# Patient Record
Sex: FEMALE | Race: WHITE | NOT HISPANIC OR LATINO | URBAN - METROPOLITAN AREA
[De-identification: names, ages, dates, MRNs, and addresses within clinical notes are randomized per-mention and may not be internally consistent; named-entity substitution may affect disease eponyms.]

---

## 2023-02-13 ENCOUNTER — INPATIENT (INPATIENT)
Facility: HOSPITAL | Age: 73
LOS: 3 days | Discharge: HOME | DRG: 602 | End: 2023-02-17
Attending: STUDENT IN AN ORGANIZED HEALTH CARE EDUCATION/TRAINING PROGRAM | Admitting: STUDENT IN AN ORGANIZED HEALTH CARE EDUCATION/TRAINING PROGRAM
Payer: MEDICARE

## 2023-02-13 VITALS
TEMPERATURE: 98 F | HEART RATE: 75 BPM | OXYGEN SATURATION: 99 % | HEIGHT: 66 IN | SYSTOLIC BLOOD PRESSURE: 94 MMHG | WEIGHT: 139.99 LBS | RESPIRATION RATE: 18 BRPM | DIASTOLIC BLOOD PRESSURE: 54 MMHG

## 2023-02-13 LAB
ALBUMIN SERPL ELPH-MCNC: 3 G/DL — LOW (ref 3.3–5)
ALP SERPL-CCNC: 86 U/L — SIGNIFICANT CHANGE UP (ref 40–120)
ALT FLD-CCNC: 31 U/L — SIGNIFICANT CHANGE UP (ref 10–45)
ANION GAP SERPL CALC-SCNC: 13 MMOL/L — SIGNIFICANT CHANGE UP (ref 5–17)
APTT BLD: 22.8 SEC — LOW (ref 27.5–35.5)
AST SERPL-CCNC: 45 U/L — HIGH (ref 10–40)
BASOPHILS # BLD AUTO: 0 K/UL — SIGNIFICANT CHANGE UP (ref 0–0.2)
BASOPHILS NFR BLD AUTO: 0 % — SIGNIFICANT CHANGE UP (ref 0–2)
BILIRUB SERPL-MCNC: 0.7 MG/DL — SIGNIFICANT CHANGE UP (ref 0.2–1.2)
BUN SERPL-MCNC: 30 MG/DL — HIGH (ref 7–23)
BURR CELLS BLD QL SMEAR: PRESENT — SIGNIFICANT CHANGE UP
CALCIUM SERPL-MCNC: 8.8 MG/DL — SIGNIFICANT CHANGE UP (ref 8.4–10.5)
CHLORIDE SERPL-SCNC: 98 MMOL/L — SIGNIFICANT CHANGE UP (ref 96–108)
CO2 SERPL-SCNC: 21 MMOL/L — LOW (ref 22–31)
CREAT SERPL-MCNC: 1.06 MG/DL — SIGNIFICANT CHANGE UP (ref 0.5–1.3)
EGFR: 55 ML/MIN/1.73M2 — LOW
EOSINOPHIL # BLD AUTO: 0.19 K/UL — SIGNIFICANT CHANGE UP (ref 0–0.5)
EOSINOPHIL NFR BLD AUTO: 3.5 % — SIGNIFICANT CHANGE UP (ref 0–6)
GIANT PLATELETS BLD QL SMEAR: PRESENT — SIGNIFICANT CHANGE UP
GLUCOSE SERPL-MCNC: 189 MG/DL — HIGH (ref 70–99)
HCT VFR BLD CALC: 32.6 % — LOW (ref 34.5–45)
HGB BLD-MCNC: 10.6 G/DL — LOW (ref 11.5–15.5)
INR BLD: 1.05 — SIGNIFICANT CHANGE UP (ref 0.88–1.16)
LYMPHOCYTES # BLD AUTO: 0.47 K/UL — LOW (ref 1–3.3)
LYMPHOCYTES # BLD AUTO: 8.7 % — LOW (ref 13–44)
MANUAL SMEAR VERIFICATION: SIGNIFICANT CHANGE UP
MCHC RBC-ENTMCNC: 27 PG — SIGNIFICANT CHANGE UP (ref 27–34)
MCHC RBC-ENTMCNC: 32.5 GM/DL — SIGNIFICANT CHANGE UP (ref 32–36)
MCV RBC AUTO: 83 FL — SIGNIFICANT CHANGE UP (ref 80–100)
METAMYELOCYTES # FLD: 0.9 % — HIGH (ref 0–0)
MONOCYTES # BLD AUTO: 0.19 K/UL — SIGNIFICANT CHANGE UP (ref 0–0.9)
MONOCYTES NFR BLD AUTO: 3.5 % — SIGNIFICANT CHANGE UP (ref 2–14)
MYELOCYTES NFR BLD: 2.6 % — HIGH (ref 0–0)
NEUTROPHILS # BLD AUTO: 4.29 K/UL — SIGNIFICANT CHANGE UP (ref 1.8–7.4)
NEUTROPHILS NFR BLD AUTO: 72.2 % — SIGNIFICANT CHANGE UP (ref 43–77)
NEUTS BAND # BLD: 7.8 % — SIGNIFICANT CHANGE UP (ref 0–8)
OVALOCYTES BLD QL SMEAR: SLIGHT — SIGNIFICANT CHANGE UP
PLAT MORPH BLD: NORMAL — SIGNIFICANT CHANGE UP
PLATELET # BLD AUTO: 100 K/UL — LOW (ref 150–400)
POIKILOCYTOSIS BLD QL AUTO: SIGNIFICANT CHANGE UP
POTASSIUM SERPL-MCNC: 4.8 MMOL/L — SIGNIFICANT CHANGE UP (ref 3.5–5.3)
POTASSIUM SERPL-SCNC: 4.8 MMOL/L — SIGNIFICANT CHANGE UP (ref 3.5–5.3)
PROT SERPL-MCNC: 6.4 G/DL — SIGNIFICANT CHANGE UP (ref 6–8.3)
PROTHROM AB SERPL-ACNC: 12.5 SEC — SIGNIFICANT CHANGE UP (ref 10.5–13.4)
RBC # BLD: 3.93 M/UL — SIGNIFICANT CHANGE UP (ref 3.8–5.2)
RBC # FLD: 12.6 % — SIGNIFICANT CHANGE UP (ref 10.3–14.5)
RBC BLD AUTO: ABNORMAL
SARS-COV-2 RNA SPEC QL NAA+PROBE: NEGATIVE — SIGNIFICANT CHANGE UP
SODIUM SERPL-SCNC: 132 MMOL/L — LOW (ref 135–145)
SPHEROCYTES BLD QL SMEAR: SLIGHT — SIGNIFICANT CHANGE UP
VARIANT LYMPHS # BLD: 0.8 % — SIGNIFICANT CHANGE UP (ref 0–6)
WBC # BLD: 5.36 K/UL — SIGNIFICANT CHANGE UP (ref 3.8–10.5)
WBC # FLD AUTO: 5.36 K/UL — SIGNIFICANT CHANGE UP (ref 3.8–10.5)

## 2023-02-13 PROCEDURE — 99223 1ST HOSP IP/OBS HIGH 75: CPT | Mod: GC

## 2023-02-13 PROCEDURE — 99285 EMERGENCY DEPT VISIT HI MDM: CPT

## 2023-02-13 RX ORDER — CEFTRIAXONE 500 MG/1
1000 INJECTION, POWDER, FOR SOLUTION INTRAMUSCULAR; INTRAVENOUS ONCE
Refills: 0 | Status: COMPLETED | OUTPATIENT
Start: 2023-02-13 | End: 2023-02-14

## 2023-02-13 RX ORDER — VANCOMYCIN HCL 1 G
1000 VIAL (EA) INTRAVENOUS ONCE
Refills: 0 | Status: COMPLETED | OUTPATIENT
Start: 2023-02-13 | End: 2023-02-13

## 2023-02-13 RX ADMIN — Medication 250 MILLIGRAM(S): at 21:43

## 2023-02-13 NOTE — ED PROVIDER NOTE - OBJECTIVE STATEMENT
Symptoms started approximately 1 month ago with ulcer  Patient went to UC and was put on Keflex  Went back to UC 2 wks later and was switched to Clinda  Was able to see ID MD Monroy last week and was started on Bactrim on Wednesday  Since yesterday, patient feeling generally unwell.  Malaise, feeling like she is getting sick  Redness persists around wound  Had televist with Dr. Monroy today who noted that wound/redness not improved and advised them to go to the ER for admission for IV abx.

## 2023-02-13 NOTE — H&P ADULT - PROBLEM SELECTOR PLAN 5
Na of 132 on admission. may be in setting of decreased PO intake as patient reported GI upset over the weekend  -f/u AM BMP, if persistently hyponatremic will pursue further work up

## 2023-02-13 NOTE — H&P ADULT - NSHPLABSRESULTS_GEN_ALL_CORE
.  LABS:                         10.6   5.36  )-----------( 100      ( 13 Feb 2023 19:51 )             32.6     02-13    132<L>  |  98  |  30<H>  ----------------------------<  189<H>  4.8   |  21<L>  |  1.06    Ca    8.8      13 Feb 2023 19:51    TPro  6.4  /  Alb  3.0<L>  /  TBili  0.7  /  DBili  x   /  AST  45<H>  /  ALT  31  /  AlkPhos  86  02-13    PT/INR - ( 13 Feb 2023 19:51 )   PT: 12.5 sec;   INR: 1.05          PTT - ( 13 Feb 2023 19:51 )  PTT:22.8 sec          RADIOLOGY, EKG & ADDITIONAL TESTS: Reviewed.

## 2023-02-13 NOTE — H&P ADULT - PROBLEM SELECTOR PLAN 1
Pt presenting for a 4 week history of persistent LLE ulcer and cellulitis. States she does not know how she got the wound, may have had trauma to the area but is unsure. Followed up with urgent care; took a 10 day course of Keflex which did not improve the symptoms, followed by one week of Clindamycin. Given persistent symptoms, patient followed with ID, Dr. Monroy outpatient, who prescribed Bactrim. After one week of Bactrim, pt had a follow up visit today and given persistent erythema, Dr. Monroy recommended admission for IV antibiotics. Pt denies fevers/chills at home, not septic on admission  -continue Vanc 1g q12h - trough before the 4th dose Pt presenting for a 4 week history of persistent LLE ulcer and cellulitis. States she does not know how she got the wound, may have had trauma to the area but is unsure. Followed up with urgent care; took a 10 day course of Keflex which did not improve the symptoms, followed by one week of Clindamycin. Given persistent symptoms, patient followed with ID, Dr. Monroy outpatient, who prescribed Bactrim. After one week of Bactrim, pt had a follow up visit today and given persistent erythema, Dr. Monroy recommended admission for IV antibiotics. Pt denies fevers/chills at home, not septic on admission  -continue Vanc 1g q12h - trough before the 4th dose  -f/u BCx  -CT LLE with IV contrast to r/o abscess  -general surgery consulted in the ED, f/u recommendations Pt presenting for a 4 week history of persistent LLE ulcer and cellulitis. States she does not know how she got the wound, may have had trauma to the area but is unsure. Followed up with urgent care; took a 10 day course of Keflex which did not improve the symptoms, followed by one week of Clindamycin. Given persistent symptoms, patient followed with ID, Dr. Monroy outpatient, who prescribed Bactrim. After one week of Bactrim, pt had a follow up visit today and given persistent erythema, Dr. Monroy recommended admission for IV antibiotics. Pt denies fevers/chills at home, not septic on admission. On physical exam, 3x3 cm area of eschar with surrounding erythema. No tenderness, fluctuance or purulence  -s/p Vanc and CTX in the ED  -f/u BCx  -CT LLE with IV contrast to r/o abscess  -general surgery consulted in the ED, f/u recommendations Pt presenting for a 4 week history of persistent LLE ulcer and cellulitis. States she does not know how she got the wound, may have had trauma to the area but is unsure. Followed up with urgent care; took a 10 day course of Keflex which did not improve the symptoms, followed by one week of Clindamycin. Given persistent symptoms, patient followed with ID, Dr. Monroy outpatient, who prescribed Bactrim. After one week of Bactrim, pt had a follow up visit today and given persistent erythema, Dr. Monroy recommended admission for IV antibiotics. Pt denies fevers/chills at home, not septic on admission. On physical exam, 3x3 cm area of eschar with surrounding erythema. No tenderness, fluctuance or purulence  -s/p Vanc and CTX in the ED; continue Vancomycin 1g q12h (trough before 4th dose) and CTX 1g q24h  -f/u ESR, CRP  -f/u BCx  -Xray LLE pending  -general surgery consulted in the ED, f/u recommendations Pt presenting for a 4 week history of persistent LLE ulcer and cellulitis. States she does not know how she got the wound, may have had trauma to the area but is unsure. Followed up with urgent care; took a 10 day course of Keflex which did not improve the symptoms, followed by one week of Clindamycin. Given persistent symptoms, patient followed with ID, Dr. Monroy outpatient, who prescribed Bactrim. After one week of Bactrim, pt had a follow up visit today and given persistent erythema, Dr. Monroy recommended admission for IV antibiotics. Pt denies fevers/chills at home, not septic on admission. On physical exam, 3x3 cm area of eschar with surrounding erythema. No tenderness, fluctuance or purulence  -s/p Vanc and CTX in the ED; continue Vancomycin 1g q12h (trough before 4th dose) and CTX 1g q24h  -f/u ESR, CRP  -f/u BCx  -Xray LLE pending  -general surgery consulted in the ED, f/u recommendations  -wound care Pt presenting for a 4 week history of persistent LLE ulcer and cellulitis. States she does not know how she got the wound, may have had trauma to the area but is unsure. Followed up with urgent care; took a 10 day course of Keflex which did not improve the symptoms, followed by one week of Clindamycin. Given persistent symptoms, patient followed with ID, Dr. Monroy outpatient, who prescribed Bactrim. After one week of Bactrim, pt had a follow up visit today and given persistent erythema, Dr. Monroy recommended admission for IV antibiotics. Pt denies fevers/chills at home, not septic on admission. On physical exam, 3x3 cm area of eschar with surrounding erythema. No tenderness, fluctuance or purulence  -s/p Vanc and CTX in the ED; continue Vancomycin 1g q24h (trough before 4th dose) and CTX 1g q24h  -f/u ESR, CRP  -f/u BCx  -Xray LLE pending  -general surgery consulted in the ED, f/u recommendations  -wound care

## 2023-02-13 NOTE — H&P ADULT - ASSESSMENT
72F with pmhx of HTN, HLD, DM, who presents for management of 4 week history of LLE ulcer and cellulitis, admitted for IV antibiotics and further management.

## 2023-02-13 NOTE — ED PROVIDER NOTE - PHYSICAL EXAMINATION
General:  Well appearing, no distress  HEENT:  No conjunctival injection, neck supple   Chest:  Non-tender, no crepitance  Lungs:  Clear to auscultation bilaterally   Heart:  s1s2 normal, no murmur  Abdomen:  soft, non-tender, non-distended  :  Deferred  Rectal:  Deferred  Extremities: Left calf lateral aspect with 3x2 cm wound with eschar with surrounding erythema c/w cellulitis.  No lymphangitis.  Mildly tender. No crepitance, no subcut air.  Neuro:  Alert and oriented x 3, cn2-12 intact, full strength, sensory grossly intact, normal gait  Psychiatry:  Calm, cooperative, no expression of suicidal or homicidal ideation

## 2023-02-13 NOTE — H&P ADULT - PROBLEM SELECTOR PLAN 3
Does not remember which medications she takes at home  -normotensive at home  -med rec in AM  -restart BP medications as needed

## 2023-02-13 NOTE — ED ADULT NURSE NOTE - NSIMPLEMENTINTERV_GEN_ALL_ED
Implemented All Universal Safety Interventions:  [place] to call system. Call bell, personal items and telephone within reach. Instruct patient to call for assistance. Room bathroom lighting operational. Non-slip footwear when patient is off stretcher. Physically safe environment: no spills, clutter or unnecessary equipment. Stretcher in lowest position, wheels locked, appropriate side rails in place.

## 2023-02-13 NOTE — H&P ADULT - NSHPREVIEWOFSYSTEMS_GEN_ALL_CORE
REVIEW OF SYSTEMS:    CONSTITUTIONAL: No weakness, fevers or chills  EYES/ENT: No visual changes;  No vertigo or throat pain   NECK: No pain or stiffness  RESPIRATORY: No cough, wheezing, hemoptysis; No shortness of breath  CARDIOVASCULAR: No chest pain or palpitations  GASTROINTESTINAL: No abdominal or epigastric pain. No nausea, vomiting, or hematemesis; No diarrhea or constipation. No melena or hematochezia.  GENITOURINARY: No dysuria, frequency or hematuria  NEUROLOGICAL: No numbness or weakness  SKIN: No itching, rashes REVIEW OF SYSTEMS:    CONSTITUTIONAL: No fevers or chills  EYES/ENT: No visual changes;  No vertigo or throat pain   NECK: No pain or stiffness  RESPIRATORY: No cough, wheezing, hemoptysis; No shortness of breath  CARDIOVASCULAR: No chest pain or palpitations  GASTROINTESTINAL: No abdominal or epigastric pain. No nausea, vomiting, or hematemesis; No diarrhea or constipation. No melena or hematochezia.  GENITOURINARY: No dysuria, frequency or hematuria  NEUROLOGICAL: No numbness or weakness  SKIN: 4 week hx of wound to the lateral aspect of left leg

## 2023-02-13 NOTE — H&P ADULT - PROBLEM SELECTOR PLAN 2
Reports she uses Metformin 500mg qD at home  -f/u A1c in AM  -mISS while inpatient  -consistent carb diet

## 2023-02-13 NOTE — H&P ADULT - NSHPPHYSICALEXAM_GEN_ALL_CORE
.  VITAL SIGNS:  T(C): 36.8 (02-13-23 @ 22:23), Max: 36.8 (02-13-23 @ 19:18)  T(F): 98.2 (02-13-23 @ 22:23), Max: 98.3 (02-13-23 @ 19:18)  HR: 75 (02-13-23 @ 22:23) (75 - 75)  BP: 106/66 (02-13-23 @ 22:23) (94/54 - 106/66)  BP(mean): --  RR: 18 (02-13-23 @ 22:23) (18 - 18)  SpO2: 95% (02-13-23 @ 22:23) (95% - 99%)  Wt(kg): --    PHYSICAL EXAM:    Constitutional: WDWN resting comfortably in bed; NAD  Head: NC/AT  Eyes: PERRL, EOMI, anicteric sclera  ENT: no nasal discharge; no oropharyngeal erythema or exudates; MMM  Neck: supple; no JVD  Respiratory: CTA B/L; no W/R/R  Cardiac: +S1/S2; RRR; no M/R/G  Gastrointestinal: abdomen soft, NT/ND; no rebound or guarding; +BSx4  Extremities: WWP, no edema  left lower extremity: lateral aspect with 3x3 cm wound with eschar, +surrounding erythema. no tenderness to palpation, no active drainage, no crepitus  Vascular: 1+ radial, DP/PT pulses B/L  Dermatologic: skin warm, dry and intact; +chronic venous stasis changes to b/l lower extremities  Neurologic: AAOx3; CNII-XII grossly intact; no focal deficits  Psychiatric: affect and characteristics of appearance, verbalizations, behaviors are appropriate

## 2023-02-13 NOTE — ED ADULT NURSE NOTE - OBJECTIVE STATEMENT
Patient presents to the ED complaining of wound to left lower leg for 4 weeks. As per daughter, the wound was being treated outpatient by her PMD however th wound is not healing. Patient reports feeling unwell. As per daughter, patient was sent to be admitted for IV antibiotics.

## 2023-02-13 NOTE — ED PROVIDER NOTE - CLINICAL SUMMARY MEDICAL DECISION MAKING FREE TEXT BOX
Patient with diabetes with infected ulcer to left calf not responding to multiple courses of outpatient antibiotics.  This may in part be due to need for wound debridement.  Will check labs and admit to medicine, consult surgery.    2125:  Surgery team advised of consult  21:35:  [facility] informed of admission and patient accepted.

## 2023-02-13 NOTE — H&P ADULT - HISTORY OF PRESENT ILLNESS
72F with pmhx of HTN, HLD, DM, who presents for management of 4 week history of LLE ulcer and cellulitis. Pt states 4 weeks ago, she noticed an ulcer to her LLE. States she may have hit her leg on something, but cannot remember. Reports initially there was pus draining from the area but after a few days, the ulcer dried up. States she initially went to an urgent care 4 weeks ago and was prescribed Keflex 10 days, which she completed a course of. After completing the course, she noticed the erythema and swelling was persistent and revisited the urgent care, who then prescribed her Clindamycin. Patient then took a week of Clindamycin, but due to persistent erythema, she follows with Infectious Disease, Dr. Monroy, who started her on Bactrim. The daughter noticed that over the weekend the patient was reporting generalized fatigue with nausea, so they scheduled a follow up with Dr. Monroy today. Pt had a telehealth visit with Dr. Monroy today, who noted the erythema was persistent and he recommended the patient come to the ED for IV antibiotics and a CT scan. Pt denies fevers, chills at home, denies pain to the lower extremities, had mild GI upset over the weekend but no longer has abdominal pain, no n/v/d. Denies hx of similar symptoms in the past.    In the ED  Vitals: T 98.3, HR 75, 94/54, 99% on RA  Labs: WBC 5.36, Hgb 10.6 (MCV 83), plt 100, Na 132, Cr 1.06, AST 45/ALT 31  Interventions: CTX1g, Vanc 1g     72F with pmhx of HTN, HLD, DM, who presents for management of 4 week history of LLE ulcer and cellulitis. Pt states 4 weeks ago, she noticed an ulcer to her LLE. States she may have hit her leg on something, but cannot remember. Reports initially there was pus draining from the area but after a few days, the ulcer dried up. States she initially went to an urgent care 4 weeks ago and was prescribed Keflex 10 days, which she completed a course of. After completing the course, she noticed the erythema and swelling was persistent and revisited the urgent care, who then prescribed her Clindamycin. Patient then took a week of Clindamycin, but due to persistent erythema, she follows with Infectious Disease, Dr. Monroy, who started her on Bactrim. The daughter noticed that over the weekend the patient was reporting generalized fatigue with nausea, so they scheduled a follow up with Dr. Monroy today. Pt had a telehealth visit with Dr. Monroy today, who noted the erythema was persistent and he recommended the patient come to the ED for IV antibiotics and a CT scan. Pt denies fevers, chills at home, denies pain to the lower extremities, had mild GI upset over the weekend but no longer has abdominal pain, no n/v/d. Denies hx of similar symptoms in the past.    In the ED  Vitals: T 98.3, HR 75, 94/54, 99% on RA  Labs: WBC 5.36, Hgb 10.6 (MCV 83), plt 100, Na 132, Cr 1.06, AST 45/ALT 31  Interventions: CTX1g, Vanc 1g  Consults: general surgery

## 2023-02-13 NOTE — ED ADULT TRIAGE NOTE - CHIEF COMPLAINT QUOTE
Pt sent by Dr. Monroy for admission for IV ABX for L lateral leg wound. Completed course of Keflex and Bactrim without improvement. Hx T2DM.

## 2023-02-14 DIAGNOSIS — E87.1 HYPO-OSMOLALITY AND HYPONATREMIA: ICD-10-CM

## 2023-02-14 DIAGNOSIS — E11.9 TYPE 2 DIABETES MELLITUS WITHOUT COMPLICATIONS: ICD-10-CM

## 2023-02-14 DIAGNOSIS — D64.9 ANEMIA, UNSPECIFIED: ICD-10-CM

## 2023-02-14 DIAGNOSIS — E78.5 HYPERLIPIDEMIA, UNSPECIFIED: ICD-10-CM

## 2023-02-14 DIAGNOSIS — Z29.9 ENCOUNTER FOR PROPHYLACTIC MEASURES, UNSPECIFIED: ICD-10-CM

## 2023-02-14 DIAGNOSIS — L03.119 CELLULITIS OF UNSPECIFIED PART OF LIMB: ICD-10-CM

## 2023-02-14 DIAGNOSIS — I10 ESSENTIAL (PRIMARY) HYPERTENSION: ICD-10-CM

## 2023-02-14 LAB
A1C WITH ESTIMATED AVERAGE GLUCOSE RESULT: 8.9 % — HIGH (ref 4–5.6)
ALBUMIN SERPL ELPH-MCNC: 3.2 G/DL — LOW (ref 3.3–5)
ALBUMIN SERPL ELPH-MCNC: 3.4 G/DL — SIGNIFICANT CHANGE UP (ref 3.3–5)
ALP SERPL-CCNC: 84 U/L — SIGNIFICANT CHANGE UP (ref 40–120)
ALP SERPL-CCNC: 93 U/L — SIGNIFICANT CHANGE UP (ref 40–120)
ALT FLD-CCNC: 102 U/L — HIGH (ref 10–45)
ALT FLD-CCNC: 95 U/L — HIGH (ref 10–45)
ANION GAP SERPL CALC-SCNC: 11 MMOL/L — SIGNIFICANT CHANGE UP (ref 5–17)
ANION GAP SERPL CALC-SCNC: 9 MMOL/L — SIGNIFICANT CHANGE UP (ref 5–17)
AST SERPL-CCNC: 124 U/L — HIGH (ref 10–40)
AST SERPL-CCNC: 137 U/L — HIGH (ref 10–40)
BASOPHILS # BLD AUTO: 0.03 K/UL — SIGNIFICANT CHANGE UP (ref 0–0.2)
BASOPHILS NFR BLD AUTO: 0.8 % — SIGNIFICANT CHANGE UP (ref 0–2)
BILIRUB SERPL-MCNC: 0.6 MG/DL — SIGNIFICANT CHANGE UP (ref 0.2–1.2)
BILIRUB SERPL-MCNC: 0.6 MG/DL — SIGNIFICANT CHANGE UP (ref 0.2–1.2)
BUN SERPL-MCNC: 18 MG/DL — SIGNIFICANT CHANGE UP (ref 7–23)
BUN SERPL-MCNC: 19 MG/DL — SIGNIFICANT CHANGE UP (ref 7–23)
CALCIUM SERPL-MCNC: 8.8 MG/DL — SIGNIFICANT CHANGE UP (ref 8.4–10.5)
CALCIUM SERPL-MCNC: 9 MG/DL — SIGNIFICANT CHANGE UP (ref 8.4–10.5)
CHLORIDE SERPL-SCNC: 100 MMOL/L — SIGNIFICANT CHANGE UP (ref 96–108)
CHLORIDE SERPL-SCNC: 101 MMOL/L — SIGNIFICANT CHANGE UP (ref 96–108)
CO2 SERPL-SCNC: 21 MMOL/L — LOW (ref 22–31)
CO2 SERPL-SCNC: 24 MMOL/L — SIGNIFICANT CHANGE UP (ref 22–31)
CREAT SERPL-MCNC: 0.57 MG/DL — SIGNIFICANT CHANGE UP (ref 0.5–1.3)
CREAT SERPL-MCNC: 0.59 MG/DL — SIGNIFICANT CHANGE UP (ref 0.5–1.3)
CRP SERPL-MCNC: 44.2 MG/L — HIGH (ref 0–4)
CRP SERPL-MCNC: 48.4 MG/L — HIGH (ref 0–4)
CRP SERPL-MCNC: 52 MG/L — HIGH (ref 0–4)
EGFR: 95 ML/MIN/1.73M2 — SIGNIFICANT CHANGE UP
EGFR: 96 ML/MIN/1.73M2 — SIGNIFICANT CHANGE UP
EOSINOPHIL # BLD AUTO: 0.29 K/UL — SIGNIFICANT CHANGE UP (ref 0–0.5)
EOSINOPHIL NFR BLD AUTO: 7.7 % — HIGH (ref 0–6)
ERYTHROCYTE [SEDIMENTATION RATE] IN BLOOD: 45 MM/HR — HIGH
ERYTHROCYTE [SEDIMENTATION RATE] IN BLOOD: 50 MM/HR — HIGH
ESTIMATED AVERAGE GLUCOSE: 209 MG/DL — HIGH (ref 68–114)
FERRITIN SERPL-MCNC: 2039 NG/ML — HIGH (ref 15–150)
GLUCOSE BLDC GLUCOMTR-MCNC: 163 MG/DL — HIGH (ref 70–99)
GLUCOSE BLDC GLUCOMTR-MCNC: 183 MG/DL — HIGH (ref 70–99)
GLUCOSE BLDC GLUCOMTR-MCNC: 210 MG/DL — HIGH (ref 70–99)
GLUCOSE BLDC GLUCOMTR-MCNC: 214 MG/DL — HIGH (ref 70–99)
GLUCOSE SERPL-MCNC: 162 MG/DL — HIGH (ref 70–99)
GLUCOSE SERPL-MCNC: 171 MG/DL — HIGH (ref 70–99)
HCT VFR BLD CALC: 35.4 % — SIGNIFICANT CHANGE UP (ref 34.5–45)
HCV AB S/CO SERPL IA: 0.08 S/CO — SIGNIFICANT CHANGE UP
HCV AB SERPL-IMP: SIGNIFICANT CHANGE UP
HGB BLD-MCNC: 11.5 G/DL — SIGNIFICANT CHANGE UP (ref 11.5–15.5)
IMM GRANULOCYTES NFR BLD AUTO: 2.4 % — HIGH (ref 0–0.9)
IRON SATN MFR SERPL: 54 % — HIGH (ref 14–50)
IRON SATN MFR SERPL: 82 UG/DL — SIGNIFICANT CHANGE UP (ref 30–160)
LYMPHOCYTES # BLD AUTO: 1.25 K/UL — SIGNIFICANT CHANGE UP (ref 1–3.3)
LYMPHOCYTES # BLD AUTO: 33.3 % — SIGNIFICANT CHANGE UP (ref 13–44)
MAGNESIUM SERPL-MCNC: 1.6 MG/DL — SIGNIFICANT CHANGE UP (ref 1.6–2.6)
MAGNESIUM SERPL-MCNC: 1.8 MG/DL — SIGNIFICANT CHANGE UP (ref 1.6–2.6)
MCHC RBC-ENTMCNC: 27 PG — SIGNIFICANT CHANGE UP (ref 27–34)
MCHC RBC-ENTMCNC: 32.5 GM/DL — SIGNIFICANT CHANGE UP (ref 32–36)
MCV RBC AUTO: 83.1 FL — SIGNIFICANT CHANGE UP (ref 80–100)
MONOCYTES # BLD AUTO: 0.38 K/UL — SIGNIFICANT CHANGE UP (ref 0–0.9)
MONOCYTES NFR BLD AUTO: 10.1 % — SIGNIFICANT CHANGE UP (ref 2–14)
NEUTROPHILS # BLD AUTO: 1.71 K/UL — LOW (ref 1.8–7.4)
NEUTROPHILS NFR BLD AUTO: 45.7 % — SIGNIFICANT CHANGE UP (ref 43–77)
NRBC # BLD: 0 /100 WBCS — SIGNIFICANT CHANGE UP (ref 0–0)
PHOSPHATE SERPL-MCNC: 2.3 MG/DL — LOW (ref 2.5–4.5)
PHOSPHATE SERPL-MCNC: 2.5 MG/DL — SIGNIFICANT CHANGE UP (ref 2.5–4.5)
PLATELET # BLD AUTO: 94 K/UL — LOW (ref 150–400)
POTASSIUM SERPL-MCNC: 4.4 MMOL/L — SIGNIFICANT CHANGE UP (ref 3.5–5.3)
POTASSIUM SERPL-MCNC: 4.7 MMOL/L — SIGNIFICANT CHANGE UP (ref 3.5–5.3)
POTASSIUM SERPL-SCNC: 4.4 MMOL/L — SIGNIFICANT CHANGE UP (ref 3.5–5.3)
POTASSIUM SERPL-SCNC: 4.7 MMOL/L — SIGNIFICANT CHANGE UP (ref 3.5–5.3)
PROT SERPL-MCNC: 6 G/DL — SIGNIFICANT CHANGE UP (ref 6–8.3)
PROT SERPL-MCNC: 6.6 G/DL — SIGNIFICANT CHANGE UP (ref 6–8.3)
RBC # BLD: 4.26 M/UL — SIGNIFICANT CHANGE UP (ref 3.8–5.2)
RBC # FLD: 12.9 % — SIGNIFICANT CHANGE UP (ref 10.3–14.5)
SODIUM SERPL-SCNC: 132 MMOL/L — LOW (ref 135–145)
SODIUM SERPL-SCNC: 134 MMOL/L — LOW (ref 135–145)
TIBC SERPL-MCNC: 153 UG/DL — LOW (ref 220–430)
TRANSFERRIN SERPL-MCNC: 141 MG/DL — LOW (ref 200–360)
UIBC SERPL-MCNC: 71 UG/DL — LOW (ref 110–370)
WBC # BLD: 3.75 K/UL — LOW (ref 3.8–10.5)
WBC # FLD AUTO: 3.75 K/UL — LOW (ref 3.8–10.5)

## 2023-02-14 PROCEDURE — 73701 CT LOWER EXTREMITY W/DYE: CPT | Mod: 26,LT

## 2023-02-14 PROCEDURE — 73590 X-RAY EXAM OF LOWER LEG: CPT | Mod: 26,LT

## 2023-02-14 PROCEDURE — 99233 SBSQ HOSP IP/OBS HIGH 50: CPT | Mod: GC

## 2023-02-14 RX ORDER — CEFEPIME 1 G/1
2000 INJECTION, POWDER, FOR SOLUTION INTRAMUSCULAR; INTRAVENOUS EVERY 8 HOURS
Refills: 0 | Status: DISCONTINUED | OUTPATIENT
Start: 2023-02-14 | End: 2023-02-15

## 2023-02-14 RX ORDER — ATORVASTATIN CALCIUM 80 MG/1
20 TABLET, FILM COATED ORAL AT BEDTIME
Refills: 0 | Status: DISCONTINUED | OUTPATIENT
Start: 2023-02-14 | End: 2023-02-17

## 2023-02-14 RX ORDER — LINAGLIPTIN 5 MG/1
1 TABLET, FILM COATED ORAL
Qty: 0 | Refills: 0 | DISCHARGE

## 2023-02-14 RX ORDER — DEXTROSE 50 % IN WATER 50 %
25 SYRINGE (ML) INTRAVENOUS ONCE
Refills: 0 | Status: DISCONTINUED | OUTPATIENT
Start: 2023-02-14 | End: 2023-02-17

## 2023-02-14 RX ORDER — SODIUM CHLORIDE 9 MG/ML
1000 INJECTION, SOLUTION INTRAVENOUS
Refills: 0 | Status: DISCONTINUED | OUTPATIENT
Start: 2023-02-14 | End: 2023-02-17

## 2023-02-14 RX ORDER — FESOTERODINE FUMARATE 8 MG/1
1 TABLET, FILM COATED, EXTENDED RELEASE ORAL
Qty: 0 | Refills: 0 | DISCHARGE

## 2023-02-14 RX ORDER — VANCOMYCIN HCL 1 G
1000 VIAL (EA) INTRAVENOUS EVERY 12 HOURS
Refills: 0 | Status: DISCONTINUED | OUTPATIENT
Start: 2023-02-14 | End: 2023-02-14

## 2023-02-14 RX ORDER — ENOXAPARIN SODIUM 100 MG/ML
40 INJECTION SUBCUTANEOUS EVERY 24 HOURS
Refills: 0 | Status: DISCONTINUED | OUTPATIENT
Start: 2023-02-14 | End: 2023-02-17

## 2023-02-14 RX ORDER — CEFEPIME 1 G/1
2000 INJECTION, POWDER, FOR SOLUTION INTRAMUSCULAR; INTRAVENOUS EVERY 8 HOURS
Refills: 0 | Status: DISCONTINUED | OUTPATIENT
Start: 2023-02-14 | End: 2023-02-14

## 2023-02-14 RX ORDER — DEXTROSE 50 % IN WATER 50 %
12.5 SYRINGE (ML) INTRAVENOUS ONCE
Refills: 0 | Status: DISCONTINUED | OUTPATIENT
Start: 2023-02-14 | End: 2023-02-17

## 2023-02-14 RX ORDER — DEXTROSE 50 % IN WATER 50 %
15 SYRINGE (ML) INTRAVENOUS ONCE
Refills: 0 | Status: DISCONTINUED | OUTPATIENT
Start: 2023-02-14 | End: 2023-02-17

## 2023-02-14 RX ORDER — LISINOPRIL 2.5 MG/1
1 TABLET ORAL
Qty: 0 | Refills: 0 | DISCHARGE

## 2023-02-14 RX ORDER — VANCOMYCIN HCL 1 G
1000 VIAL (EA) INTRAVENOUS EVERY 24 HOURS
Refills: 0 | Status: COMPLETED | OUTPATIENT
Start: 2023-02-14 | End: 2023-02-16

## 2023-02-14 RX ORDER — ROSUVASTATIN CALCIUM 5 MG/1
1 TABLET ORAL
Qty: 0 | Refills: 0 | DISCHARGE

## 2023-02-14 RX ORDER — METFORMIN HYDROCHLORIDE 850 MG/1
1 TABLET ORAL
Qty: 0 | Refills: 0 | DISCHARGE

## 2023-02-14 RX ORDER — INSULIN LISPRO 100/ML
VIAL (ML) SUBCUTANEOUS
Refills: 0 | Status: DISCONTINUED | OUTPATIENT
Start: 2023-02-14 | End: 2023-02-17

## 2023-02-14 RX ORDER — GLUCAGON INJECTION, SOLUTION 0.5 MG/.1ML
1 INJECTION, SOLUTION SUBCUTANEOUS ONCE
Refills: 0 | Status: DISCONTINUED | OUTPATIENT
Start: 2023-02-14 | End: 2023-02-17

## 2023-02-14 RX ADMIN — Medication 2: at 12:40

## 2023-02-14 RX ADMIN — Medication 4: at 09:38

## 2023-02-14 RX ADMIN — Medication 2: at 17:17

## 2023-02-14 RX ADMIN — CEFEPIME 100 MILLIGRAM(S): 1 INJECTION, POWDER, FOR SOLUTION INTRAMUSCULAR; INTRAVENOUS at 14:35

## 2023-02-14 RX ADMIN — CEFTRIAXONE 100 MILLIGRAM(S): 500 INJECTION, POWDER, FOR SOLUTION INTRAMUSCULAR; INTRAVENOUS at 00:47

## 2023-02-14 RX ADMIN — Medication 250 MILLIGRAM(S): at 22:28

## 2023-02-14 RX ADMIN — ATORVASTATIN CALCIUM 20 MILLIGRAM(S): 80 TABLET, FILM COATED ORAL at 22:28

## 2023-02-14 RX ADMIN — CEFEPIME 100 MILLIGRAM(S): 1 INJECTION, POWDER, FOR SOLUTION INTRAMUSCULAR; INTRAVENOUS at 22:28

## 2023-02-14 RX ADMIN — Medication 85 MILLIMOLE(S): at 17:36

## 2023-02-14 RX ADMIN — ENOXAPARIN SODIUM 40 MILLIGRAM(S): 100 INJECTION SUBCUTANEOUS at 22:28

## 2023-02-14 NOTE — CONSULT NOTE ADULT - SUBJECTIVE AND OBJECTIVE BOX
HPI:  72F with pmhx of HTN, HLD, DM, who presents for management of 4 week history of LLE ulcer and cellulitis. Pt states 4 weeks ago, she noticed an ulcer to her LLE. States she may have hit her leg on something, but cannot remember. Reports initially there was pus draining from the area but after a few days, the ulcer dried up. States she initially went to an urgent care 4 weeks ago and was prescribed Keflex 10 days, which she completed a course of. After completing the course, she noticed the erythema and swelling was persistent and revisited the urgent care, who then prescribed her Clindamycin. Patient then took a week of Clindamycin, but due to persistent erythema, she follows with Infectious Disease, Dr. Monroy, who started her on Bactrim. The daughter noticed that over the weekend the patient was reporting generalized fatigue with nausea, so they scheduled a follow up with Dr. Monroy today. Pt had a telehealth visit with Dr. Monroy today, who noted the erythema was persistent and he recommended the patient come to the ED for IV antibiotics and a CT scan. Pt denies fevers, chills at home, denies pain to the lower extremities, had mild GI upset over the weekend but no longer has abdominal pain, no n/v/d. Denies hx of similar symptoms in the past.    In the ED  Vitals: T 98.3, HR 75, 94/54, 99% on RA  Labs: WBC 5.36, Hgb 10.6 (MCV 83), plt 100, Na 132, Cr 1.06, AST 45/ALT 31  Interventions: CTX1g, Vanc 1g  Consults: general surgery     (13 Feb 2023 23:18)      PAST MEDICAL & SURGICAL HISTORY:  Diabetes      Hypertension      Hyperlipidemia          REVIEW OF SYSTEMS:    Constitutional: No fever, weight loss or fatigue  Eyes: No eye pain, visual disturbances, or discharge  ENMT:  No difficulty hearing, tinnitus, vertigo; No sinus or throat pain  Neck: No pain or stiffness  Respiratory: No cough, wheezing, chills or hemoptysis  Cardiovascular: No chest pain, palpitations, shortness of breath, dizziness or leg swelling  Gastrointestinal: No abdominal or epigastric pain. No nausea, vomiting or hematemesis; No diarrhea or constipation. No melena or hematochezia.  Genitourinary: No dysuria, frequency, hematuria or incontinence  Neurological: No headaches, memory loss, loss of strength, numbness or tremors  Skin: No itching, burning, rashes or lesions   Lymph Nodes: No enlarged glands  Endocrine: No heat or cold intolerance; No hair loss  Musculoskeletal: left leg scab on leg and surrounding redness  Heme/Lymph: No easy bruising or bleeding gums  Allergy and Immunologic: No hives or eczema    MEDICATIONS  (STANDING):  atorvastatin 20 milliGRAM(s) Oral at bedtime  cefepime   IVPB 2000 milliGRAM(s) IV Intermittent every 8 hours  dextrose 5%. 1000 milliLiter(s) (50 mL/Hr) IV Continuous <Continuous>  dextrose 5%. 1000 milliLiter(s) (100 mL/Hr) IV Continuous <Continuous>  dextrose 50% Injectable 25 Gram(s) IV Push once  dextrose 50% Injectable 12.5 Gram(s) IV Push once  dextrose 50% Injectable 25 Gram(s) IV Push once  enoxaparin Injectable 40 milliGRAM(s) SubCutaneous every 24 hours  glucagon  Injectable 1 milliGRAM(s) IntraMuscular once  insulin lispro (ADMELOG) corrective regimen sliding scale   SubCutaneous three times a day before meals  vancomycin  IVPB 1000 milliGRAM(s) IV Intermittent every 24 hours    MEDICATIONS  (PRN):  dextrose Oral Gel 15 Gram(s) Oral once PRN Blood Glucose LESS THAN 70 milliGRAM(s)/deciliter      cefepime   IVPB 2000 milliGRAM(s) IV Intermittent every 8 hours  vancomycin  IVPB 1000 milliGRAM(s) IV Intermittent every 24 hours      Allergies    No Known Allergies    Intolerances        SOCIAL HISTORY:    FAMILY HISTORY:      Vital Signs Last 24 Hrs  T(C): 37 (14 Feb 2023 05:47), Max: 37 (14 Feb 2023 05:47)  T(F): 98.6 (14 Feb 2023 05:47), Max: 98.6 (14 Feb 2023 05:47)  HR: 77 (14 Feb 2023 05:47) (69 - 77)  BP: 112/69 (14 Feb 2023 05:47) (94/54 - 112/69)  BP(mean): --  RR: 18 (14 Feb 2023 05:47) (17 - 18)  SpO2: 97% (14 Feb 2023 05:47) (95% - 99%)    Parameters below as of 14 Feb 2023 05:47  Patient On (Oxygen Delivery Method): room air        PHYSICAL EXAM:    General: Well developed; well nourished; in no acute distress  Eyes: PERRL, EOM intact; conjunctiva and sclera clear  Head: Normocephalic; atraumatic  ENMT: No nasal discharge; airway clear  Neck: Supple; non tender; no masses  Respiratory: No wheezes, rales or rhonchi  Cardiovascular: Regular rate and rhythm. S1 and S2 Normal; No murmurs, gallops or rubs  Gastrointestinal: Soft non-tender non-distended; Normal bowel sounds; No hepatosplenomegaly  Genitourinary: No costovertebral angle tenderness  Extremities:  left leg escar with some slight surround erythema, not fluctuant  Vascular: Peripheal pulses palpable 2+ bilaterally  Neurological: Alert and oriented x3  Skin: Warm and dry. No acute rash  Lymph Nodes: No acute cervical adenopathy  Musculoskeletal: Normal gait, tone, without deformities    LABS:                        10.6   5.36  )-----------( 100      ( 13 Feb 2023 19:51 )             32.6     02-13    132<L>  |  98  |  30<H>  ----------------------------<  189<H>  4.8   |  21<L>  |  1.06    Ca    8.8      13 Feb 2023 19:51    TPro  6.4  /  Alb  3.0<L>  /  TBili  0.7  /  DBili  x   /  AST  45<H>  /  ALT  31  /  AlkPhos  86  02-13    PT/INR - ( 13 Feb 2023 19:51 )   PT: 12.5 sec;   INR: 1.05          PTT - ( 13 Feb 2023 19:51 )  PTT:22.8 sec    Culture Results:   No growth at 12 hours (02-13 @ 19:45)  Culture Results:   No growth at 12 hours (02-13 @ 19:40)    RADIOLOGY & ADDITIONAL STUDIES:

## 2023-02-14 NOTE — PATIENT PROFILE ADULT - FALL HARM RISK - TYPE OF ASSESSMENT
Pt remains in paper scrubs, resting in stretcher comfortably. No signs of distress noted. Sitter remains at bedside in direct visual contact, charting per protocol every 15 minutes. No equipment or belongings are in the patients room. Pt aware of plan of care. Will continue to monitor.      Admission

## 2023-02-14 NOTE — PROGRESS NOTE ADULT - ATTENDING COMMENTS
#LLE cellulitis with eschar:   sp failed outpt oral abx tx x3 w keflex, clinda and bactrim   started on vanc and rocephin overnight-> will transition to cefepime for pseudomonas coverage   #hyponatremia Na 132-> fu repeat bmp- will encourage oral intake-> likely 2/2 poor po intake in the setting of abx use   #uncontrolled DM: a1c 8.9- cw hss  #HTN: Lisinopril on hold 2.2 normotensive bp, will resume as indicated   dvt ppx as above

## 2023-02-14 NOTE — PROGRESS NOTE ADULT - SUBJECTIVE AND OBJECTIVE BOX
INCOMPLETE INTERVAL HPI/OVERNIGHT EVENTS:  Patient was seen and examined at bedside. As per nurse and patient, no o/n events, patient resting comfortably. Patient reports feeling tired; otherwise no complaints at this time. She denies pain or discomfort of the LLE and believes that the ulcer is improving. ROS otherwise negative.    Outside providers:   PCP is Dr. Rupal Duque  Endocrinologist is Dr. Tianna Pina for management of DM  Urologist is Dr. Goss for management of urinary frequency    VITAL SIGNS:  T(F): 98.6 (02-14-23 @ 05:47)  HR: 77 (02-14-23 @ 05:47)  BP: 112/69 (02-14-23 @ 05:47)  RR: 18 (02-14-23 @ 05:47)  SpO2: 97% (02-14-23 @ 05:47)  Wt(kg): --        PHYSICAL EXAM:    Constitutional: resting comfortably in bed; NAD  HEENT: NC/AT, PER, anicteric sclera, no nasal discharge; MMM  Neck: supple; no JVD  Respiratory: CTA B/L; no W/R/R, no retractions  Cardiac: +S1/S2; RRR; no M/R/G  Gastrointestinal: soft, NT/ND; no rebound or guarding  Extremities: WWP, no clubbing or cyanosis; no peripheral edema  Musculoskeletal: NROM x4; no joint swelling, tenderness or erythema  Vascular: 1+ radial, DP/PT pulses B/L  Dermatologic: skin warm, dry and intact; + chronic venous stasis changes on BLE; 3x3 eschar with surrounding erythema noted on lateral LLE  Neurologic: AAOx4; no focal deficits  Psychiatric: affect and characteristics of appearance, verbalizations, behaviors are appropriate    MEDICATIONS  (STANDING):  atorvastatin 20 milliGRAM(s) Oral at bedtime  dextrose 5%. 1000 milliLiter(s) (50 mL/Hr) IV Continuous <Continuous>  dextrose 5%. 1000 milliLiter(s) (100 mL/Hr) IV Continuous <Continuous>  dextrose 50% Injectable 25 Gram(s) IV Push once  dextrose 50% Injectable 12.5 Gram(s) IV Push once  dextrose 50% Injectable 25 Gram(s) IV Push once  glucagon  Injectable 1 milliGRAM(s) IntraMuscular once  insulin lispro (ADMELOG) corrective regimen sliding scale   SubCutaneous three times a day before meals  vancomycin  IVPB 1000 milliGRAM(s) IV Intermittent every 24 hours    MEDICATIONS  (PRN):  dextrose Oral Gel 15 Gram(s) Oral once PRN Blood Glucose LESS THAN 70 milliGRAM(s)/deciliter      Allergies    No Known Allergies    Intolerances        LABS:                        10.6   5.36  )-----------( 100      ( 13 Feb 2023 19:51 )             32.6     02-13    132<L>  |  98  |  30<H>  ----------------------------<  189<H>  4.8   |  21<L>  |  1.06    Ca    8.8      13 Feb 2023 19:51    TPro  6.4  /  Alb  3.0<L>  /  TBili  0.7  /  DBili  x   /  AST  45<H>  /  ALT  31  /  AlkPhos  86  02-13    PT/INR - ( 13 Feb 2023 19:51 )   PT: 12.5 sec;   INR: 1.05          PTT - ( 13 Feb 2023 19:51 )  PTT:22.8 sec      RADIOLOGY & ADDITIONAL TESTS:  Reviewed INTERVAL HPI/OVERNIGHT EVENTS:  Patient was seen and examined at bedside. As per overnight team, no o/n events, patient resting comfortably. Patient reports feeling tired; otherwise no complaints at this time. She denies pain or discomfort of the LLE and believes that the ulcer is improving. ROS otherwise negative.    Outside providers:   PCP is Dr. Rupal Duque  Endocrinologist is Dr. Tianna Pina for management of DM  Urologist is Dr. Goss for management of urinary frequency    VITAL SIGNS:  T(F): 98.6 (02-14-23 @ 05:47)  HR: 77 (02-14-23 @ 05:47)  BP: 112/69 (02-14-23 @ 05:47)  RR: 18 (02-14-23 @ 05:47)  SpO2: 97% (02-14-23 @ 05:47)  Wt(kg): --        PHYSICAL EXAM:    Constitutional: resting comfortably in bed; NAD  HEENT: NC/AT, PER, anicteric sclera, no nasal discharge; MMM  Neck: supple; no JVD  Respiratory: CTA B/L; no W/R/R, no retractions  Cardiac: +S1/S2; RRR; no M/R/G  Gastrointestinal: soft, NT/ND; no rebound or guarding  Extremities: WWP, no clubbing or cyanosis; no peripheral edema  Musculoskeletal: NROM x4; no joint swelling, tenderness or erythema  Vascular: 1+ radial, DP/PT pulses B/L  Dermatologic: skin warm, dry and intact; + chronic venous stasis changes on BLE; 3x3 eschar with surrounding erythema noted on lateral LLE  Neurologic: AAOx4; no focal deficits  Psychiatric: affect and characteristics of appearance, verbalizations, behaviors are appropriate    MEDICATIONS  (STANDING):  atorvastatin 20 milliGRAM(s) Oral at bedtime  dextrose 5%. 1000 milliLiter(s) (50 mL/Hr) IV Continuous <Continuous>  dextrose 5%. 1000 milliLiter(s) (100 mL/Hr) IV Continuous <Continuous>  dextrose 50% Injectable 25 Gram(s) IV Push once  dextrose 50% Injectable 12.5 Gram(s) IV Push once  dextrose 50% Injectable 25 Gram(s) IV Push once  glucagon  Injectable 1 milliGRAM(s) IntraMuscular once  insulin lispro (ADMELOG) corrective regimen sliding scale   SubCutaneous three times a day before meals  vancomycin  IVPB 1000 milliGRAM(s) IV Intermittent every 24 hours    MEDICATIONS  (PRN):  dextrose Oral Gel 15 Gram(s) Oral once PRN Blood Glucose LESS THAN 70 milliGRAM(s)/deciliter      Allergies    No Known Allergies    Intolerances        LABS:                        10.6   5.36  )-----------( 100      ( 13 Feb 2023 19:51 )             32.6     02-13    132<L>  |  98  |  30<H>  ----------------------------<  189<H>  4.8   |  21<L>  |  1.06    Ca    8.8      13 Feb 2023 19:51    TPro  6.4  /  Alb  3.0<L>  /  TBili  0.7  /  DBili  x   /  AST  45<H>  /  ALT  31  /  AlkPhos  86  02-13    PT/INR - ( 13 Feb 2023 19:51 )   PT: 12.5 sec;   INR: 1.05          PTT - ( 13 Feb 2023 19:51 )  PTT:22.8 sec      RADIOLOGY & ADDITIONAL TESTS:  Reviewed INTERVAL HPI/OVERNIGHT EVENTS:  Patient was seen and examined at bedside. As per overnight team, no o/n events, patient resting comfortably. Patient reports feeling tired; otherwise no complaints at this time. She denies pain or discomfort of the LLE and believes that the ulcer is improving. ROS otherwise negative.    Per patient, Dr. Monroy came by this morning to see her. She states that Dr. Monroy recommended CT scan and endorsed that antibiotic recommendations will be made pending CT results. Dr. Monroy's cell #: [phone]; no answer, VM full.    Outside providers:   PCP is Dr. Rupal Duque  Endocrinologist is Dr. Tianna Pina for management of DM  Urologist is Dr. Goss for management of urinary frequency    VITAL SIGNS:  T(F): 98.6 (02-14-23 @ 05:47)  HR: 77 (02-14-23 @ 05:47)  BP: 112/69 (02-14-23 @ 05:47)  RR: 18 (02-14-23 @ 05:47)  SpO2: 97% (02-14-23 @ 05:47)  Wt(kg): --        PHYSICAL EXAM:    Constitutional: resting comfortably in bed; NAD  HEENT: NC/AT, PER, anicteric sclera, no nasal discharge; MMM  Neck: supple; no JVD  Respiratory: CTA B/L; no W/R/R, no retractions  Cardiac: +S1/S2; RRR; no M/R/G  Gastrointestinal: soft, NT/ND; no rebound or guarding  Extremities: WWP, no clubbing or cyanosis; no peripheral edema  Musculoskeletal: NROM x4; no joint swelling, tenderness or erythema  Vascular: 1+ radial, DP/PT pulses B/L  Dermatologic: skin warm, dry and intact; + chronic venous stasis changes on BLE; 3x3 eschar with surrounding erythema noted on lateral LLE  Neurologic: AAOx4; no focal deficits  Psychiatric: affect and characteristics of appearance, verbalizations, behaviors are appropriate    MEDICATIONS  (STANDING):  atorvastatin 20 milliGRAM(s) Oral at bedtime  dextrose 5%. 1000 milliLiter(s) (50 mL/Hr) IV Continuous <Continuous>  dextrose 5%. 1000 milliLiter(s) (100 mL/Hr) IV Continuous <Continuous>  dextrose 50% Injectable 25 Gram(s) IV Push once  dextrose 50% Injectable 12.5 Gram(s) IV Push once  dextrose 50% Injectable 25 Gram(s) IV Push once  glucagon  Injectable 1 milliGRAM(s) IntraMuscular once  insulin lispro (ADMELOG) corrective regimen sliding scale   SubCutaneous three times a day before meals  vancomycin  IVPB 1000 milliGRAM(s) IV Intermittent every 24 hours    MEDICATIONS  (PRN):  dextrose Oral Gel 15 Gram(s) Oral once PRN Blood Glucose LESS THAN 70 milliGRAM(s)/deciliter      Allergies    No Known Allergies    Intolerances        LABS:                        10.6   5.36  )-----------( 100      ( 13 Feb 2023 19:51 )             32.6     02-13    132<L>  |  98  |  30<H>  ----------------------------<  189<H>  4.8   |  21<L>  |  1.06    Ca    8.8      13 Feb 2023 19:51    TPro  6.4  /  Alb  3.0<L>  /  TBili  0.7  /  DBili  x   /  AST  45<H>  /  ALT  31  /  AlkPhos  86  02-13    PT/INR - ( 13 Feb 2023 19:51 )   PT: 12.5 sec;   INR: 1.05          PTT - ( 13 Feb 2023 19:51 )  PTT:22.8 sec      RADIOLOGY & ADDITIONAL TESTS:  Reviewed INTERVAL HPI/OVERNIGHT EVENTS:  Patient was seen and examined at bedside. As per overnight team, no o/n events, patient resting comfortably. Patient reports feeling tired; otherwise no complaints at this time. She denies pain or discomfort of the LLE and believes that the ulcer is improving. ROS otherwise negative.    Outside providers:   PCP is Dr. Rupal Duque  Endocrinologist is Dr. Tianna Pina for management of DM  Urologist is Dr. Goss for management of urinary frequency    VITAL SIGNS:  T(F): 98.6 (02-14-23 @ 05:47)  HR: 77 (02-14-23 @ 05:47)  BP: 112/69 (02-14-23 @ 05:47)  RR: 18 (02-14-23 @ 05:47)  SpO2: 97% (02-14-23 @ 05:47)  Wt(kg): --        PHYSICAL EXAM:    Constitutional: resting comfortably in bed; NAD  HEENT: NC/AT, PER, anicteric sclera, no nasal discharge; MMM  Neck: supple  Respiratory: CTA B/L; no W/R/R, no retractions  Cardiac: +S1/S2; RRR; no M/R/G  Gastrointestinal: soft, NT/ND; no rebound or guarding  Extremities: WWP, no clubbing or cyanosis; no peripheral edema  Musculoskeletal: NROM x4; no joint swelling, tenderness or erythema  Vascular: 1+ radial, DP/PT pulses B/L  Dermatologic: skin warm, dry and intact; + chronic venous stasis changes on BLE; 3x3 eschar with surrounding erythema noted on lateral LLE  Neurologic: AAOx4; no focal deficits  Psychiatric: affect and characteristics of appearance, verbalizations, behaviors are appropriate    MEDICATIONS  (STANDING):  atorvastatin 20 milliGRAM(s) Oral at bedtime  dextrose 5%. 1000 milliLiter(s) (50 mL/Hr) IV Continuous <Continuous>  dextrose 5%. 1000 milliLiter(s) (100 mL/Hr) IV Continuous <Continuous>  dextrose 50% Injectable 25 Gram(s) IV Push once  dextrose 50% Injectable 12.5 Gram(s) IV Push once  dextrose 50% Injectable 25 Gram(s) IV Push once  glucagon  Injectable 1 milliGRAM(s) IntraMuscular once  insulin lispro (ADMELOG) corrective regimen sliding scale   SubCutaneous three times a day before meals  vancomycin  IVPB 1000 milliGRAM(s) IV Intermittent every 24 hours    MEDICATIONS  (PRN):  dextrose Oral Gel 15 Gram(s) Oral once PRN Blood Glucose LESS THAN 70 milliGRAM(s)/deciliter      Allergies    No Known Allergies    Intolerances        LABS:                        10.6   5.36  )-----------( 100      ( 13 Feb 2023 19:51 )             32.6     02-13    132<L>  |  98  |  30<H>  ----------------------------<  189<H>  4.8   |  21<L>  |  1.06    Ca    8.8      13 Feb 2023 19:51    TPro  6.4  /  Alb  3.0<L>  /  TBili  0.7  /  DBili  x   /  AST  45<H>  /  ALT  31  /  AlkPhos  86  02-13    PT/INR - ( 13 Feb 2023 19:51 )   PT: 12.5 sec;   INR: 1.05          PTT - ( 13 Feb 2023 19:51 )  PTT:22.8 sec      RADIOLOGY & ADDITIONAL TESTS:  Reviewed

## 2023-02-14 NOTE — PATIENT PROFILE ADULT - IS THERE A SUSPICION OF ABUSE/NEGLIGENCE?
no
PROBLEM DIAGNOSES  Problem: Other specified disorders of bladder  Assessment and Plan: patient is scheduled for cystoscopy transurethral resection of bladder tumor on 8/17/2020

## 2023-02-14 NOTE — PROGRESS NOTE ADULT - PROBLEM SELECTOR PLAN 2
Reports she uses Metformin 500mg qD at home  -f/u A1c in AM  -mISS while inpatient  -consistent carb diet Reports she uses Metformin 500mg qD at home. A1c 8.9    -mISS while inpatient  -consistent carb diet Reports she uses Metformin 500mg qD, linagliptin 5mg, and insulin glargine pen at home. A1c 8.9    -mISS while inpatient  -consistent carb diet Home meds: Metformin 500mg qD, linagliptin 5mg, and insulin glargine pen. A1c 8.9  - mISS while inpatient  - consistent carb diet

## 2023-02-14 NOTE — PATIENT PROFILE ADULT - FALL HARM RISK - HARM RISK INTERVENTIONS

## 2023-02-14 NOTE — CONSULT NOTE ADULT - SUBJECTIVE AND OBJECTIVE BOX
Surgery Consult    Consulting Surgical Team:   [ ] Team 1 - Colorectal/Surgical Oncology ([phone])    [ ] Team 2 - MIS/Bariatric Surgery ([phone])     [ x ] Team 4 - Acute Care Surgery ([phone])     [ ] Team 5 - General Surgery/Breast/Pediatric Surgery ([phone])    Consulting Attending: Dr. Fischer    HPI:  72F with pmhx of HTN, HLD, DM, who presents for management of 4 week history of LLE ulcer and cellulitis. Pt states 4 weeks ago, she noticed an ulcer to her LLE. States she may have hit her leg on something, but cannot remember. Reports initially there was pus draining from the area but after a few days, the ulcer dried up. States she initially went to an urgent care 4 weeks ago and was prescribed Keflex 10 days, which she completed a course of. After completing the course, she noticed the erythema and swelling was persistent and revisited the urgent care, who then prescribed her Clindamycin. Patient then took a week of Clindamycin, but due to persistent erythema, she follows with Infectious Disease, Dr. Monroy, who started her on Bactrim. The daughter noticed that over the weekend the patient was reporting generalized fatigue with nausea, so they scheduled a follow up with Dr. Monroy today. Pt had a telehealth visit with Dr. Monroy today, who noted the erythema was persistent and he recommended the patient come to the ED for IV antibiotics and a CT scan. Pt denies fevers, chills at home, denies pain to the lower extremities, had mild GI upset over the weekend but no longer has abdominal pain, no n/v/d. Denies hx of similar symptoms in the past.    In the ED  Vitals: T 98.3, HR 75, 94/54, 99% on RA  Labs: WBC 5.36, Hgb 10.6 (MCV 83), plt 100, Na 132, Cr 1.06, AST 45/ALT 31  Interventions: CTX1g, Vanc 1g  Consults: general surgery     (13 Feb 2023 23:18)      GENERAL SURGERY ADDENDUM  Patient with above history. Presents encouraged by PCP with several week history of worsening LLE cellulitis without improvement on multiple PO abx regimens. Believes wound started after banging leg on stove. No claudication symptoms at baseline. Denies worsening pain at the area. Denies fevers, chills, chest pain, dyspnea, abdominal pain, n/v, changes to vision      PAST MEDICAL HISTORY:  Diabetes    Hypertension    Hyperlipidemia        PAST SURGICAL HISTORY:      MEDICATIONS:      ALLERGIES:  No Known Allergies      SOCHX:   Social History:  Lives at home with   Able to perform all ADLs  no alcohol, tobacco or drug use (13 Feb 2023 23:18)      FAMHX:   FAMILY HISTORY:        Vitals:  Vital Signs Last 24 Hrs  T(C): 36.8 (13 Feb 2023 23:58), Max: 36.8 (13 Feb 2023 19:18)  T(F): 98.3 (13 Feb 2023 23:58), Max: 98.3 (13 Feb 2023 19:18)  HR: 69 (13 Feb 2023 23:58) (69 - 75)  BP: 103/62 (13 Feb 2023 23:58) (94/54 - 106/66)  BP(mean): --  RR: 17 (13 Feb 2023 23:58) (17 - 18)  SpO2: 95% (13 Feb 2023 23:58) (95% - 99%)    Parameters below as of 13 Feb 2023 23:58  Patient On (Oxygen Delivery Method): room air          PHYSICAL EXAM:  Physical Exam  General: NAD, resting comfortably in bed  Pulm: Nonlabored breathing, no respiratory distress  Abd: soft, non distended  Extrem: WWP, LLE erythema with area of eschar, non tender, no purulence. Palpable DP and pop on LLE    I&o's:  I&O's Summary      LABS:                        10.6   5.36  )-----------( 100      ( 13 Feb 2023 19:51 )             32.6     02-13    132<L>  |  98  |  30<H>  ----------------------------<  189<H>  4.8   |  21<L>  |  1.06    Ca    8.8      13 Feb 2023 19:51    TPro  6.4  /  Alb  3.0<L>  /  TBili  0.7  /  DBili  x   /  AST  45<H>  /  ALT  31  /  AlkPhos  86  02-13    Lactate:    PT/INR - ( 13 Feb 2023 19:51 )   PT: 12.5 sec;   INR: 1.05          PTT - ( 13 Feb 2023 19:51 )  PTT:22.8 sec

## 2023-02-14 NOTE — PROGRESS NOTE ADULT - PROBLEM SELECTOR PLAN 4
Hgb of 10.6 on admission. Denies melena, hematochezia, hematuria. MCV 83  -f/u iron studies Hgb of 10.6 on admission. Denies melena, hematochezia, hematuria. MCV 83    -iron studies consistent with AOCD, elevated ferritin, low TIBC  - Hgb stable Hgb of 10.6 on admission. Denies melena, hematochezia, hematuria. MCV 83    - iron studies consistent with AOCD, elevated ferritin, low TIBC  - Hgb stable

## 2023-02-14 NOTE — CONSULT NOTE ADULT - ASSESSMENT
per Internal Medicine    72 y o F with pmhx of HTN, HLD, DM, who presents for management of 4 week history of LLE ulcer and cellulitis, admitted for IV antibiotics and further management.    Problem/Plan - 1:  ·  Problem: Cellulitis of leg.   ·  Plan: Pt presenting for a 4 week history of persistent LLE ulcer and cellulitis. States she does not know how she got the wound, may have had trauma to the area but is unsure. Followed up with urgent care; took a 10 day course of Keflex which did not improve the symptoms, followed by one week of Clindamycin. Given persistent symptoms, patient followed with ID, Dr. Monroy outpatient, who prescribed Bactrim. After one week of Bactrim, pt had a follow up visit today and given persistent erythema, Dr. Monroy recommended admission for IV antibiotics. Pt denies fevers/chills at home, not septic on admission. On physical exam, 3x3 cm area of eschar with surrounding erythema. No tenderness, fluctuance or purulence  -s/p Vanc and CTX in the ED; continue Vancomycin 1g q24h (trough before 4th dose) and CTX 1g q24h  -f/u ESR, CRP  -f/u BCx  -Xray LLE pending  -general surgery consulted in the ED, f/u recommendations  -wound care.    Problem/Plan - 2:  ·  Problem: Diabetes.   ·  Plan: Reports she uses Metformin 500mg qD at home  -f/u A1c in AM  -mISS while inpatient  -consistent carb diet.    Problem/Plan - 3:  ·  Problem: Hypertension.   ·  Plan: Does not remember which medications she takes at home  -normotensive at home  -med rec in AM  -restart BP medications as needed.    Problem/Plan - 4:  ·  Problem: Anemia.   ·  Plan: Hgb of 10.6 on admission. Denies melena, hematochezia, hematuria. MCV 83  -f/u iron studies.    Problem/Plan - 5:  ·  Problem: Hyponatremia.   ·  Plan: Na of 132 on admission. may be in setting of decreased PO intake as patient reported GI upset over the weekend  -f/u AM BMP, if persistently hyponatremic will pursue further work up.    Problem/Plan - 6:  ·  Problem: Hyperlipidemia.   ·  Plan: c/w rosuvastatin 5.    Problem/Plan - 7:  ·  Problem: Prophylactic measure.   ·  Plan: F: none  E: replete K>4 Mg>2  N: consistent carb  D: lovenox  dispo: RMF.

## 2023-02-14 NOTE — PROGRESS NOTE ADULT - PROBLEM SELECTOR PLAN 1
Pt presenting for a 4 week history of persistent LLE ulcer and cellulitis. States she does not know how she got the wound, may have had trauma to the area but is unsure. Followed up with urgent care; took a 10 day course of Keflex which did not improve the symptoms, followed by one week of Clindamycin. Given persistent symptoms, patient followed with ID, Dr. Monroy outpatient, who prescribed Bactrim. After one week of Bactrim, pt had a follow up visit today and given persistent erythema, Dr. Monroy recommended admission for IV antibiotics. Pt denies fevers/chills at home, not septic on admission. On physical exam, 3x3 cm area of eschar with surrounding erythema. No tenderness, fluctuance or purulence  -s/p Vanc and CTX in the ED; continue Vancomycin 1g q24h (trough before 4th dose) and CTX 1g q24h  -f/u ESR, CRP  -f/u BCx  -Xray LLE pending  -general surgery consulted in the ED, f/u recommendations  -wound care Pt presenting for a 4 week history of persistent LLE ulcer and cellulitis. States she does not know how she got the wound, may have had trauma to the area but is unsure. Followed up with urgent care; took a 10 day course of Keflex which did not improve the symptoms, followed by one week of Clindamycin. Given persistent symptoms, patient followed with ID, Dr. Monroy outpatient, who prescribed Bactrim. After one week of Bactrim, pt had a follow up visit today and given persistent erythema, Dr. Monroy recommended admission for IV antibiotics. Pt denies fevers/chills at home, not septic on admission. On physical exam, 3x3 cm area of eschar with surrounding erythema. No tenderness, fluctuance or purulence  -c/w Vancomycin 1g q24h (trough before 4th dose) and CTX 1g q24h  -f/u ESR, CRP, mildly elevated  -f/u BCx  -Xray LLE, no evidence of OM  - f/u CT LLE   -general surgery consulted in the ED, no surgical interventions. Recommend pseudomonal Zosyn  -wound care Pt presenting for a 4 week history of persistent LLE ulcer and cellulitis. States she does not know how she got the wound, may have had trauma to the area but is unsure. Followed up with urgent care; took a 10 day course of Keflex which did not improve the symptoms, followed by one week of Clindamycin. Given persistent symptoms, patient followed with ID, Dr. Monroy outpatient, who prescribed Bactrim. After one week of Bactrim, pt had a follow up visit today and given persistent erythema, Dr. Monroy recommended admission for IV antibiotics. Pt denies fevers/chills at home, not septic on admission. On physical exam, 3x3 cm area of eschar with surrounding erythema. No tenderness, fluctuance or purulence  - ESR, CRP mildly elevated  - Xray LLE, no evidence of OM  - general surgery consulted in the ED, no surgical interventions. Recommend pseudomonal Zosyn    Plan:  - c/w Vancomycin 1g q24h (trough before 4th dose) and CTX 1g q24h  - f/u BCx  - f/u CT LLE   - wound care  - f/u ID recs; contact Dr. Monroy Pt presenting for a 4 week history of persistent LLE ulcer and cellulitis. States she does not know how she got the wound, may have had trauma to the area but is unsure. Followed up with urgent care; took a 10 day course of Keflex which did not improve the symptoms, followed by one week of Clindamycin. Given persistent symptoms, patient followed with ID, Dr. Monroy outpatient, who prescribed Bactrim. After one week of Bactrim, pt had a follow up visit today and given persistent erythema, Dr. Monroy recommended admission for IV antibiotics. Pt denies fevers/chills at home, not septic on admission. On physical exam, 3x3 cm area of eschar with surrounding erythema. No tenderness, fluctuance or purulence  - ESR, CRP mildly elevated  - Xray LLE, no evidence of OM  - general surgery consulted in the ED, no surgical interventions.   - s/p 1g ceftriaxone   Plan:  - c/w Vancomycin 1g q24h (trough before 4th dose)  - start cefepime 2g q8hrs  - f/u BCx  - f/u CT LLE   - wound care  - f/u ID recs

## 2023-02-14 NOTE — CONSULT NOTE ADULT - SUBJECTIVE AND OBJECTIVE BOX
Patient is a 72y old  Female who presents with a chief complaint of       HPI:  72F with pmhx of HTN, HLD, DM, who presents for management of 4 week history of LLE ulcer and cellulitis. Pt states 4 weeks ago, she noticed an ulcer to her LLE. States she may have hit her leg on something, but cannot remember. Reports initially there was pus draining from the area but after a few days, the ulcer dried up. States she initially went to an urgent care 4 weeks ago and was prescribed Keflex 10 days, which she completed a course of. After completing the course, she noticed the erythema and swelling was persistent and revisited the urgent care, who then prescribed her Clindamycin. Patient then took a week of Clindamycin, but due to persistent erythema, she follows with Infectious Disease, Dr. Monroy, who started her on Bactrim. The daughter noticed that over the weekend the patient was reporting generalized fatigue with nausea, so they scheduled a follow up with Dr. Monroy today. Pt had a telehealth visit with Dr. Monroy today, who noted the erythema was persistent and he recommended the patient come to the ED for IV antibiotics and a CT scan. Pt denies fevers, chills at home, denies pain to the lower extremities, had mild GI upset over the weekend but no longer has abdominal pain, no n/v/d. Denies hx of similar symptoms in the past.    In the ED  Vitals: T 98.3, HR 75, 94/54, 99% on RA  Labs: WBC 5.36, Hgb 10.6 (MCV 83), plt 100, Na 132, Cr 1.06, AST 45/ALT 31  Interventions: CTX1g, Vanc 1g  Consults: general surgery     (13 Feb 2023 23:18)      PAST MEDICAL & SURGICAL HISTORY:  Diabetes      Hypertension      Hyperlipidemia          MEDICATIONS  (STANDING):  atorvastatin 20 milliGRAM(s) Oral at bedtime  dextrose 5%. 1000 milliLiter(s) (50 mL/Hr) IV Continuous <Continuous>  dextrose 5%. 1000 milliLiter(s) (100 mL/Hr) IV Continuous <Continuous>  dextrose 50% Injectable 25 Gram(s) IV Push once  dextrose 50% Injectable 12.5 Gram(s) IV Push once  dextrose 50% Injectable 25 Gram(s) IV Push once  glucagon  Injectable 1 milliGRAM(s) IntraMuscular once  insulin lispro (ADMELOG) corrective regimen sliding scale   SubCutaneous three times a day before meals  vancomycin  IVPB 1000 milliGRAM(s) IV Intermittent every 24 hours    MEDICATIONS  (PRN):  dextrose Oral Gel 15 Gram(s) Oral once PRN Blood Glucose LESS THAN 70 milliGRAM(s)/deciliter           FAMILY HISTORY:    CBC Full  -  ( 13 Feb 2023 19:51 )  WBC Count : 5.36 K/uL  RBC Count : 3.93 M/uL  Hemoglobin : 10.6 g/dL  Hematocrit : 32.6 %  Platelet Count - Automated : 100 K/uL  Mean Cell Volume : 83.0 fl  Mean Cell Hemoglobin : 27.0 pg  Mean Cell Hemoglobin Concentration : 32.5 gm/dL  Auto Neutrophil # : 4.29 K/uL  Auto Lymphocyte # : 0.47 K/uL  Auto Monocyte # : 0.19 K/uL  Auto Eosinophil # : 0.19 K/uL  Auto Basophil # : 0.00 K/uL  Auto Neutrophil % : 72.2 %  Auto Lymphocyte % : 8.7 %  Auto Monocyte % : 3.5 %  Auto Eosinophil % : 3.5 %  Auto Basophil % : 0.0 %      02-13    132<L>  |  98  |  30<H>  ----------------------------<  189<H>  4.8   |  21<L>  |  1.06    Ca    8.8      13 Feb 2023 19:51    TPro  6.4  /  Alb  3.0<L>  /  TBili  0.7  /  DBili  x   /  AST  45<H>  /  ALT  31  /  AlkPhos  86  02-13            Radiology :     < from: Xray Tibia + Fibula 2 Views, Left (02.14.23 @ 06:52) >  ACC: [id number] EXAM:  XR TIB FIB 2 VIEWS LT   ORDERED BY: JOSIE FELICIANO     PROCEDURE DATE:  02/14/2023          INTERPRETATION:  Clinical history reason for exam: Wound.    2 views.    Findings/  impression: Soft tissue swelling with no radiographic findings to suggest   the presence of osteomyelitis, acute fracture or dislocation. Plantar   calcaneal 7 mm spur          Vital Signs Last 24 Hrs  T(C): 37 (14 Feb 2023 05:47), Max: 37 (14 Feb 2023 05:47)  T(F): 98.6 (14 Feb 2023 05:47), Max: 98.6 (14 Feb 2023 05:47)  HR: 77 (14 Feb 2023 05:47) (69 - 77)  BP: 112/69 (14 Feb 2023 05:47) (94/54 - 112/69)  BP(mean): --  RR: 18 (14 Feb 2023 05:47) (17 - 18)  SpO2: 97% (14 Feb 2023 05:47) (95% - 99%)    Parameters below as of 14 Feb 2023 05:47  Patient On (Oxygen Delivery Method): room air            REVIEW OF SYSTEMS:      CONSTITUTIONAL: No fever, weight loss, or fatigue  EYES: No eye pain, visual disturbances, or discharge  ENMT:  No difficulty hearing, tinnitus, vertigo; No sinus or throat pain  NECK: No pain or stiffness  BREASTS: No pain, masses, or nipple discharge  RESPIRATORY: No cough, wheezing, chills or hemoptysis; No shortness of breath  CARDIOVASCULAR: No chest pain, palpitations, dizziness, or leg swelling  GASTROINTESTINAL: No abdominal or epigastric pain. No nausea, vomiting, or hematemesis; No diarrhea or constipation. No melena or hematochezia.  GENITOURINARY: No dysuria, frequency, hematuria, or incontinence  NEUROLOGICAL: No headaches, memory loss, loss of strength, numbness, or tremors  SKIN: No itching, burning, rashes, or lesions   LYMPH NODES: No enlarged glands  ENDOCRINE: No heat or cold intolerance; No hair loss  MUSCULOSKELETAL: No joint pain or swelling; No muscle, back, or extremity pain  PSYCHIATRIC: No depression, anxiety, mood swings, or difficulty sleeping  HEME/LYMPH: No easy bruising, or bleeding gums  ALLERGY AND IMMUNOLOGIC: No hives or eczema  VASCULAR: per hpi           Physical Exam :  72 y o woman lying comfortably in semi Lentz's position , awake , alert , no new complaints     Head : normocephalic , atraumatic    Eyes : PERRLA , EOMI , no nystagmus , sclera anicteric    ENT : nasal discharge , uvula midline , no oropharyngeal erythema / exudate    Neck : supple , negative JVD , negative carotid bruits , no thyromegaly    Chest : CTA bilaterally     Cardiovascular : regular rate and rhythm      Abdomen : soft , non distended , non tender to palpation in all 4 quadrants , normal bowel sounds      Extremities : LLE eschar with erythema     Neurologic Exam :    Alert and oriented  x 4    Motor Exam:          Right UE:               no focal weakness ,  > 3+/5 throughout                                 Left UE:                 no focal weakness ,  > 3+/5 throughout          Right LE:                no focal weakness ,  > 3+/5 throughout        Left LE:                  no focal weakness ,  > 3+/5 throughout         Sensation :         intact to light touch x 4 extremities        DTR :                     biceps/brachioradialis : equal                                              patella/ankle : equal      Gait :  not tested        PM&R Impression :  admitted for LLE cellulitis       Recommendations / Plan :     1) Physical / Occupational therapy focusing on therapeutic exercises , equipment evaluation , bed mobility/transfer out of bed evaluation , progressive ambulation with assistive devices prn .    2) Current disposition plan recommendation  :  pending functional progress , probable d/c home

## 2023-02-14 NOTE — PROGRESS NOTE ADULT - PROBLEM SELECTOR PLAN 5
Na of 132 on admission. may be in setting of decreased PO intake as patient reported GI upset over the weekend  -f/u AM BMP, if persistently hyponatremic will pursue further work up Na of 132 on admission. may be in setting of decreased PO intake as patient reported GI upset over the weekend    - AM BMP pending Na of 132 on admission  - AM BMP pending; if not improved, order urine osm, urine lytes, and serum osm Na of 132 on admission, serum glucose 189; corrected Na 133  - AM BMP pending

## 2023-02-14 NOTE — CONSULT NOTE ADULT - ASSESSMENT
72F with PMH of HTN, HLD, DM, who presents for management of 4 week history of LLE ulcer and cellulitis. Afebrile, nontachycardic and normotensive. Exam significant for LLE cellulitis with area of eschar. No crepitus. labs with normal WBC, Normal Cr. Received vanc and CTX in the ED. Low concern for NSTI, area would not likely benefit from debridement at this time.     - no acute surgical intervention  - Would recommend adding pseudomonal coverage  - Obtain CT scan of LE, Surgery will follow  - Surgery Team 4C will continue to follow. Please page Team 4 with questions/clinical changes. [phone]

## 2023-02-14 NOTE — PROGRESS NOTE ADULT - PROBLEM SELECTOR PLAN 3
Does not remember which medications she takes at home  -normotensive at home  -med rec in AM  -restart BP medications as needed Does not remember which medications she takes at home  - currently normotensive  - restart BP medications as needed  - pending formal med rec Takes lisinopril 20mg qd at home  - currently normotensive  - restart BP medications as needed

## 2023-02-15 DIAGNOSIS — R11.2 NAUSEA WITH VOMITING, UNSPECIFIED: ICD-10-CM

## 2023-02-15 DIAGNOSIS — R19.7 DIARRHEA, UNSPECIFIED: ICD-10-CM

## 2023-02-15 DIAGNOSIS — E04.9 NONTOXIC GOITER, UNSPECIFIED: ICD-10-CM

## 2023-02-15 LAB
ALBUMIN SERPL ELPH-MCNC: 2.8 G/DL — LOW (ref 3.3–5)
ALBUMIN SERPL ELPH-MCNC: 2.8 G/DL — LOW (ref 3.3–5)
ALBUMIN SERPL ELPH-MCNC: 3 G/DL — LOW (ref 3.3–5)
ALP SERPL-CCNC: 87 U/L — SIGNIFICANT CHANGE UP (ref 40–120)
ALT FLD-CCNC: 115 U/L — HIGH (ref 10–45)
ALT FLD-CCNC: 95 U/L — HIGH (ref 10–45)
ALT FLD-CCNC: 99 U/L — HIGH (ref 10–45)
ANION GAP SERPL CALC-SCNC: 10 MMOL/L — SIGNIFICANT CHANGE UP (ref 5–17)
ANION GAP SERPL CALC-SCNC: 11 MMOL/L — SIGNIFICANT CHANGE UP (ref 5–17)
ANION GAP SERPL CALC-SCNC: 6 MMOL/L — SIGNIFICANT CHANGE UP (ref 5–17)
ANISOCYTOSIS BLD QL: SLIGHT — SIGNIFICANT CHANGE UP
APTT BLD: 26.2 SEC — LOW (ref 27.5–35.5)
APTT BLD: 29.1 SEC — SIGNIFICANT CHANGE UP (ref 27.5–35.5)
AST SERPL-CCNC: 102 U/L — HIGH (ref 10–40)
AST SERPL-CCNC: 56 U/L — HIGH (ref 10–40)
AST SERPL-CCNC: 72 U/L — HIGH (ref 10–40)
BASE EXCESS BLDA CALC-SCNC: 3.7 MMOL/L — HIGH (ref -2–3)
BASOPHILS # BLD AUTO: 0.05 K/UL — SIGNIFICANT CHANGE UP (ref 0–0.2)
BASOPHILS NFR BLD AUTO: 0.9 % — SIGNIFICANT CHANGE UP (ref 0–2)
BILIRUB SERPL-MCNC: 0.5 MG/DL — SIGNIFICANT CHANGE UP (ref 0.2–1.2)
BILIRUB SERPL-MCNC: 0.6 MG/DL — SIGNIFICANT CHANGE UP (ref 0.2–1.2)
BILIRUB SERPL-MCNC: 0.7 MG/DL — SIGNIFICANT CHANGE UP (ref 0.2–1.2)
BLD GP AB SCN SERPL QL: POSITIVE — SIGNIFICANT CHANGE UP
BUN SERPL-MCNC: 10 MG/DL — SIGNIFICANT CHANGE UP (ref 7–23)
BUN SERPL-MCNC: 11 MG/DL — SIGNIFICANT CHANGE UP (ref 7–23)
BUN SERPL-MCNC: 13 MG/DL — SIGNIFICANT CHANGE UP (ref 7–23)
BURR CELLS BLD QL SMEAR: PRESENT — SIGNIFICANT CHANGE UP
CALCIUM SERPL-MCNC: 8.4 MG/DL — SIGNIFICANT CHANGE UP (ref 8.4–10.5)
CALCIUM SERPL-MCNC: 8.7 MG/DL — SIGNIFICANT CHANGE UP (ref 8.4–10.5)
CALCIUM SERPL-MCNC: 8.8 MG/DL — SIGNIFICANT CHANGE UP (ref 8.4–10.5)
CHLORIDE SERPL-SCNC: 101 MMOL/L — SIGNIFICANT CHANGE UP (ref 96–108)
CHLORIDE SERPL-SCNC: 102 MMOL/L — SIGNIFICANT CHANGE UP (ref 96–108)
CHLORIDE SERPL-SCNC: 102 MMOL/L — SIGNIFICANT CHANGE UP (ref 96–108)
CO2 BLDA-SCNC: 27 MMOL/L — HIGH (ref 19–24)
CO2 SERPL-SCNC: 24 MMOL/L — SIGNIFICANT CHANGE UP (ref 22–31)
CO2 SERPL-SCNC: 25 MMOL/L — SIGNIFICANT CHANGE UP (ref 22–31)
CO2 SERPL-SCNC: 26 MMOL/L — SIGNIFICANT CHANGE UP (ref 22–31)
CREAT SERPL-MCNC: 0.39 MG/DL — LOW (ref 0.5–1.3)
CREAT SERPL-MCNC: 0.4 MG/DL — LOW (ref 0.5–1.3)
CREAT SERPL-MCNC: 0.52 MG/DL — SIGNIFICANT CHANGE UP (ref 0.5–1.3)
CRP SERPL-MCNC: 37.4 MG/L — HIGH (ref 0–4)
D DIMER BLD IA.RAPID-MCNC: 654 NG/ML DDU — HIGH
EGFR: 104 ML/MIN/1.73M2 — SIGNIFICANT CHANGE UP
EGFR: 105 ML/MIN/1.73M2 — SIGNIFICANT CHANGE UP
EGFR: 98 ML/MIN/1.73M2 — SIGNIFICANT CHANGE UP
EOSINOPHIL # BLD AUTO: 0.05 K/UL — SIGNIFICANT CHANGE UP (ref 0–0.5)
EOSINOPHIL NFR BLD AUTO: 0.9 % — SIGNIFICANT CHANGE UP (ref 0–6)
ERYTHROCYTE [SEDIMENTATION RATE] IN BLOOD: 81 MM/HR — HIGH
GAS PNL BLDA: SIGNIFICANT CHANGE UP
GIANT PLATELETS BLD QL SMEAR: PRESENT — SIGNIFICANT CHANGE UP
GLUCOSE BLDC GLUCOMTR-MCNC: 176 MG/DL — HIGH (ref 70–99)
GLUCOSE BLDC GLUCOMTR-MCNC: 188 MG/DL — HIGH (ref 70–99)
GLUCOSE BLDC GLUCOMTR-MCNC: 200 MG/DL — HIGH (ref 70–99)
GLUCOSE BLDC GLUCOMTR-MCNC: 218 MG/DL — HIGH (ref 70–99)
GLUCOSE BLDC GLUCOMTR-MCNC: 243 MG/DL — HIGH (ref 70–99)
GLUCOSE SERPL-MCNC: 187 MG/DL — HIGH (ref 70–99)
GLUCOSE SERPL-MCNC: 193 MG/DL — HIGH (ref 70–99)
GLUCOSE SERPL-MCNC: 239 MG/DL — HIGH (ref 70–99)
HCO3 BLDA-SCNC: 26 MMOL/L — SIGNIFICANT CHANGE UP (ref 21–28)
HCT VFR BLD CALC: 33.5 % — LOW (ref 34.5–45)
HCT VFR BLD CALC: 34.8 % — SIGNIFICANT CHANGE UP (ref 34.5–45)
HGB BLD-MCNC: 10.9 G/DL — LOW (ref 11.5–15.5)
HGB BLD-MCNC: 11.2 G/DL — LOW (ref 11.5–15.5)
HYPOCHROMIA BLD QL: SLIGHT — SIGNIFICANT CHANGE UP
INR BLD: 1.02 — SIGNIFICANT CHANGE UP (ref 0.88–1.16)
INR BLD: 1.02 — SIGNIFICANT CHANGE UP (ref 0.88–1.16)
LACTATE SERPL-SCNC: 1 MMOL/L — SIGNIFICANT CHANGE UP (ref 0.5–2)
LACTATE SERPL-SCNC: 1.5 MMOL/L — SIGNIFICANT CHANGE UP (ref 0.5–2)
LYMPHOCYTES # BLD AUTO: 1.79 K/UL — SIGNIFICANT CHANGE UP (ref 1–3.3)
LYMPHOCYTES # BLD AUTO: 35.1 % — SIGNIFICANT CHANGE UP (ref 13–44)
MAGNESIUM SERPL-MCNC: 1.5 MG/DL — LOW (ref 1.6–2.6)
MAGNESIUM SERPL-MCNC: 1.5 MG/DL — LOW (ref 1.6–2.6)
MAGNESIUM SERPL-MCNC: 1.6 MG/DL — SIGNIFICANT CHANGE UP (ref 1.6–2.6)
MANUAL SMEAR VERIFICATION: SIGNIFICANT CHANGE UP
MCHC RBC-ENTMCNC: 27.1 PG — SIGNIFICANT CHANGE UP (ref 27–34)
MCHC RBC-ENTMCNC: 27.1 PG — SIGNIFICANT CHANGE UP (ref 27–34)
MCHC RBC-ENTMCNC: 32.2 GM/DL — SIGNIFICANT CHANGE UP (ref 32–36)
MCHC RBC-ENTMCNC: 32.5 GM/DL — SIGNIFICANT CHANGE UP (ref 32–36)
MCV RBC AUTO: 83.3 FL — SIGNIFICANT CHANGE UP (ref 80–100)
MCV RBC AUTO: 84.1 FL — SIGNIFICANT CHANGE UP (ref 80–100)
MICROCYTES BLD QL: SLIGHT — SIGNIFICANT CHANGE UP
MONOCYTES # BLD AUTO: 0.55 K/UL — SIGNIFICANT CHANGE UP (ref 0–0.9)
MONOCYTES NFR BLD AUTO: 10.8 % — SIGNIFICANT CHANGE UP (ref 2–14)
NEUTROPHILS # BLD AUTO: 2.57 K/UL — SIGNIFICANT CHANGE UP (ref 1.8–7.4)
NEUTROPHILS NFR BLD AUTO: 46 % — SIGNIFICANT CHANGE UP (ref 43–77)
NEUTS BAND # BLD: 4.5 % — SIGNIFICANT CHANGE UP (ref 0–8)
NRBC # BLD: 0 /100 WBCS — SIGNIFICANT CHANGE UP (ref 0–0)
NRBC # BLD: 1 /100 — HIGH (ref 0–0)
NRBC # BLD: SIGNIFICANT CHANGE UP /100 WBCS (ref 0–0)
OVALOCYTES BLD QL SMEAR: SLIGHT — SIGNIFICANT CHANGE UP
PCO2 BLDA: 32 MMHG — SIGNIFICANT CHANGE UP (ref 32–45)
PH BLDA: 7.52 — HIGH (ref 7.35–7.45)
PHOSPHATE SERPL-MCNC: 2.2 MG/DL — LOW (ref 2.5–4.5)
PHOSPHATE SERPL-MCNC: 2.5 MG/DL — SIGNIFICANT CHANGE UP (ref 2.5–4.5)
PHOSPHATE SERPL-MCNC: 2.8 MG/DL — SIGNIFICANT CHANGE UP (ref 2.5–4.5)
PLAT MORPH BLD: ABNORMAL
PLATELET # BLD AUTO: 106 K/UL — LOW (ref 150–400)
PLATELET # BLD AUTO: 89 K/UL — LOW (ref 150–400)
PO2 BLDA: 100 MMHG — SIGNIFICANT CHANGE UP (ref 83–108)
POIKILOCYTOSIS BLD QL AUTO: SLIGHT — SIGNIFICANT CHANGE UP
POLYCHROMASIA BLD QL SMEAR: SLIGHT — SIGNIFICANT CHANGE UP
POTASSIUM SERPL-MCNC: 4.3 MMOL/L — SIGNIFICANT CHANGE UP (ref 3.5–5.3)
POTASSIUM SERPL-MCNC: 4.5 MMOL/L — SIGNIFICANT CHANGE UP (ref 3.5–5.3)
POTASSIUM SERPL-MCNC: 4.9 MMOL/L — SIGNIFICANT CHANGE UP (ref 3.5–5.3)
POTASSIUM SERPL-SCNC: 4.3 MMOL/L — SIGNIFICANT CHANGE UP (ref 3.5–5.3)
POTASSIUM SERPL-SCNC: 4.5 MMOL/L — SIGNIFICANT CHANGE UP (ref 3.5–5.3)
POTASSIUM SERPL-SCNC: 4.9 MMOL/L — SIGNIFICANT CHANGE UP (ref 3.5–5.3)
PROT SERPL-MCNC: 6 G/DL — SIGNIFICANT CHANGE UP (ref 6–8.3)
PROT SERPL-MCNC: 6.1 G/DL — SIGNIFICANT CHANGE UP (ref 6–8.3)
PROT SERPL-MCNC: 6.4 G/DL — SIGNIFICANT CHANGE UP (ref 6–8.3)
PROTHROM AB SERPL-ACNC: 12.1 SEC — SIGNIFICANT CHANGE UP (ref 10.5–13.4)
PROTHROM AB SERPL-ACNC: 12.2 SEC — SIGNIFICANT CHANGE UP (ref 10.5–13.4)
RBC # BLD: 4.02 M/UL — SIGNIFICANT CHANGE UP (ref 3.8–5.2)
RBC # BLD: 4.14 M/UL — SIGNIFICANT CHANGE UP (ref 3.8–5.2)
RBC # FLD: 12.6 % — SIGNIFICANT CHANGE UP (ref 10.3–14.5)
RBC # FLD: 12.8 % — SIGNIFICANT CHANGE UP (ref 10.3–14.5)
RBC BLD AUTO: ABNORMAL
RH IG SCN BLD-IMP: POSITIVE — SIGNIFICANT CHANGE UP
SAO2 % BLDA: 98.6 % — HIGH (ref 94–98)
SMUDGE CELLS # BLD: PRESENT — SIGNIFICANT CHANGE UP
SODIUM SERPL-SCNC: 134 MMOL/L — LOW (ref 135–145)
SODIUM SERPL-SCNC: 136 MMOL/L — SIGNIFICANT CHANGE UP (ref 135–145)
SODIUM SERPL-SCNC: 137 MMOL/L — SIGNIFICANT CHANGE UP (ref 135–145)
TSH SERPL-MCNC: <0.118 UIU/ML — LOW (ref 0.27–4.2)
TSH SERPL-MCNC: <0.118 UIU/ML — LOW (ref 0.27–4.2)
VARIANT LYMPHS # BLD: 1.8 % — SIGNIFICANT CHANGE UP (ref 0–6)
WBC # BLD: 4.73 K/UL — SIGNIFICANT CHANGE UP (ref 3.8–10.5)
WBC # BLD: 5.09 K/UL — SIGNIFICANT CHANGE UP (ref 3.8–10.5)
WBC # FLD AUTO: 4.73 K/UL — SIGNIFICANT CHANGE UP (ref 3.8–10.5)
WBC # FLD AUTO: 5.09 K/UL — SIGNIFICANT CHANGE UP (ref 3.8–10.5)

## 2023-02-15 PROCEDURE — 99233 SBSQ HOSP IP/OBS HIGH 50: CPT | Mod: GC

## 2023-02-15 PROCEDURE — 93010 ELECTROCARDIOGRAM REPORT: CPT

## 2023-02-15 PROCEDURE — 71045 X-RAY EXAM CHEST 1 VIEW: CPT | Mod: 26

## 2023-02-15 PROCEDURE — 70450 CT HEAD/BRAIN W/O DYE: CPT | Mod: 26,59

## 2023-02-15 PROCEDURE — 86077 PHYS BLOOD BANK SERV XMATCH: CPT

## 2023-02-15 PROCEDURE — 70496 CT ANGIOGRAPHY HEAD: CPT | Mod: 26

## 2023-02-15 PROCEDURE — 70498 CT ANGIOGRAPHY NECK: CPT | Mod: 26

## 2023-02-15 RX ORDER — ACETAMINOPHEN 500 MG
650 TABLET ORAL EVERY 6 HOURS
Refills: 0 | Status: DISCONTINUED | OUTPATIENT
Start: 2023-02-15 | End: 2023-02-17

## 2023-02-15 RX ORDER — SODIUM CHLORIDE 9 MG/ML
1000 INJECTION, SOLUTION INTRAVENOUS
Refills: 0 | Status: DISCONTINUED | OUTPATIENT
Start: 2023-02-15 | End: 2023-02-16

## 2023-02-15 RX ORDER — MAGNESIUM SULFATE 500 MG/ML
2 VIAL (ML) INJECTION ONCE
Refills: 0 | Status: COMPLETED | OUTPATIENT
Start: 2023-02-15 | End: 2023-02-15

## 2023-02-15 RX ORDER — ONDANSETRON 8 MG/1
4 TABLET, FILM COATED ORAL ONCE
Refills: 0 | Status: COMPLETED | OUTPATIENT
Start: 2023-02-15 | End: 2023-02-15

## 2023-02-15 RX ORDER — MAGNESIUM SULFATE 500 MG/ML
4 VIAL (ML) INJECTION ONCE
Refills: 0 | Status: COMPLETED | OUTPATIENT
Start: 2023-02-15 | End: 2023-02-15

## 2023-02-15 RX ORDER — HYDROCORTISONE 1 %
1 OINTMENT (GRAM) TOPICAL
Refills: 0 | Status: DISCONTINUED | OUTPATIENT
Start: 2023-02-15 | End: 2023-02-17

## 2023-02-15 RX ORDER — SODIUM CHLORIDE 9 MG/ML
500 INJECTION, SOLUTION INTRAVENOUS ONCE
Refills: 0 | Status: DISCONTINUED | OUTPATIENT
Start: 2023-02-15 | End: 2023-02-15

## 2023-02-15 RX ORDER — GABAPENTIN 400 MG/1
100 CAPSULE ORAL EVERY 8 HOURS
Refills: 0 | Status: DISCONTINUED | OUTPATIENT
Start: 2023-02-15 | End: 2023-02-15

## 2023-02-15 RX ORDER — MEROPENEM 1 G/30ML
1000 INJECTION INTRAVENOUS EVERY 8 HOURS
Refills: 0 | Status: DISCONTINUED | OUTPATIENT
Start: 2023-02-15 | End: 2023-02-16

## 2023-02-15 RX ORDER — INSULIN GLARGINE 100 [IU]/ML
5 INJECTION, SOLUTION SUBCUTANEOUS AT BEDTIME
Refills: 0 | Status: DISCONTINUED | OUTPATIENT
Start: 2023-02-15 | End: 2023-02-17

## 2023-02-15 RX ORDER — LISINOPRIL 2.5 MG/1
20 TABLET ORAL EVERY 24 HOURS
Refills: 0 | Status: DISCONTINUED | OUTPATIENT
Start: 2023-02-15 | End: 2023-02-17

## 2023-02-15 RX ORDER — LISINOPRIL 2.5 MG/1
20 TABLET ORAL EVERY 24 HOURS
Refills: 0 | Status: DISCONTINUED | OUTPATIENT
Start: 2023-02-15 | End: 2023-02-15

## 2023-02-15 RX ORDER — CEFTRIAXONE 500 MG/1
1000 INJECTION, POWDER, FOR SOLUTION INTRAMUSCULAR; INTRAVENOUS EVERY 24 HOURS
Refills: 0 | Status: DISCONTINUED | OUTPATIENT
Start: 2023-02-15 | End: 2023-02-15

## 2023-02-15 RX ORDER — LANOLIN ALCOHOL/MO/W.PET/CERES
5 CREAM (GRAM) TOPICAL AT BEDTIME
Refills: 0 | Status: DISCONTINUED | OUTPATIENT
Start: 2023-02-15 | End: 2023-02-17

## 2023-02-15 RX ADMIN — ONDANSETRON 4 MILLIGRAM(S): 8 TABLET, FILM COATED ORAL at 11:05

## 2023-02-15 RX ADMIN — INSULIN GLARGINE 5 UNIT(S): 100 INJECTION, SOLUTION SUBCUTANEOUS at 22:34

## 2023-02-15 RX ADMIN — Medication 5 MILLIGRAM(S): at 00:54

## 2023-02-15 RX ADMIN — ENOXAPARIN SODIUM 40 MILLIGRAM(S): 100 INJECTION SUBCUTANEOUS at 22:01

## 2023-02-15 RX ADMIN — Medication 2: at 22:33

## 2023-02-15 RX ADMIN — Medication 650 MILLIGRAM(S): at 00:54

## 2023-02-15 RX ADMIN — GABAPENTIN 100 MILLIGRAM(S): 400 CAPSULE ORAL at 09:39

## 2023-02-15 RX ADMIN — Medication 25 GRAM(S): at 23:58

## 2023-02-15 RX ADMIN — Medication 2: at 09:38

## 2023-02-15 RX ADMIN — Medication 650 MILLIGRAM(S): at 02:00

## 2023-02-15 RX ADMIN — CEFEPIME 100 MILLIGRAM(S): 1 INJECTION, POWDER, FOR SOLUTION INTRAMUSCULAR; INTRAVENOUS at 05:33

## 2023-02-15 RX ADMIN — CEFTRIAXONE 100 MILLIGRAM(S): 500 INJECTION, POWDER, FOR SOLUTION INTRAMUSCULAR; INTRAVENOUS at 15:11

## 2023-02-15 RX ADMIN — Medication 650 MILLIGRAM(S): at 10:20

## 2023-02-15 RX ADMIN — Medication 1 APPLICATION(S): at 18:46

## 2023-02-15 RX ADMIN — SODIUM CHLORIDE 100 MILLILITER(S): 9 INJECTION, SOLUTION INTRAVENOUS at 15:11

## 2023-02-15 RX ADMIN — Medication 650 MILLIGRAM(S): at 09:39

## 2023-02-15 RX ADMIN — MEROPENEM 100 MILLIGRAM(S): 1 INJECTION INTRAVENOUS at 22:00

## 2023-02-15 RX ADMIN — Medication 4: at 13:30

## 2023-02-15 RX ADMIN — LISINOPRIL 20 MILLIGRAM(S): 2.5 TABLET ORAL at 10:18

## 2023-02-15 NOTE — PROGRESS NOTE ADULT - PROBLEM SELECTOR PLAN 3
Home meds: Metformin 500mg qD, linagliptin 5mg, and insulin glargine pen 10-20U. A1c 8.9  - mISS while inpatient  - start lantus 5U qd  - consistent carb diet

## 2023-02-15 NOTE — PROGRESS NOTE ADULT - PROBLEM SELECTOR PLAN 6
Na of 132 on admission, serum glucose 189; corrected Na 133  Plan:   - improved to 134, corrected 136 on 2/15  - c/w monitor Hgb of 10.6 on admission. Denies melena, hematochezia, hematuria. MCV 83  Plan:  - iron studies consistent with AOCD, elevated ferritin, low TIBC  - Hgb stable

## 2023-02-15 NOTE — PROGRESS NOTE ADULT - PROBLEM SELECTOR PLAN 8
F: none  E: replete K>4 Mg>2  N: consistent carb  D: lovenox  dispo: [facility] F: none  E: replete K>4 Mg>2  N: consistent carb  D: lovenox  dispo: [facility] Na of 132 on admission, serum glucose 189; corrected Na 133. As of 2/16, hyponatremia resolved.   Plan:   - improved to 134, corrected 136 on 2/15  - Continue to monitor

## 2023-02-15 NOTE — PROGRESS NOTE ADULT - PROBLEM SELECTOR PLAN 6
Na of 132 on admission, serum glucose 189; corrected Na 133  - improved to 134, corrected 136 on 2/15  - c/w monitor

## 2023-02-15 NOTE — PROGRESS NOTE ADULT - PROBLEM SELECTOR PLAN 4
Takes lisinopril 20mg qd at home  Plan:   - restart lisinopril 20mg  - c/w monitor Pt reportedly taking Metformin 500mg qD, linagliptin 5mg, and insulin glargine pen 10-20U. A1c 8.9  Plan:   - mISS while inpatient  - start lantus 5U qd  - consistent carb diet

## 2023-02-15 NOTE — PROGRESS NOTE ADULT - ASSESSMENT
72F with PMH of HTN, HLD, DM, who presents for management of 4 week history of LLE ulcer and cellulitis. Afebrile, nontachycardic and normotensive. Exam significant for LLE cellulitis with area of eschar. No crepitus. labs with normal WBC, Normal Cr. Received vanc and CTX in the ED. Low concern for NSTI, area would not likely benefit from debridement at this time.     - no acute surgical intervention  - Continue antibiotics   - Surgery Team 4C will continue to follow. Please page Team 4 with questions/clinical changes. [phone] 72F with PMH of HTN, HLD, DM, who presents for management of 4 week history of LLE ulcer and cellulitis. Afebrile, nontachycardic and normotensive. Exam significant for LLE cellulitis with area of eschar. No crepitus. labs with normal WBC, Normal Cr. Received vanc and CTX in the ED. Low concern for NSTI, area would not likely benefit from debridement at this time.     - no acute surgical intervention  - Continue antibiotics   -f/u derm recs    - Surgery Team 4C will sign off for now. Please page Team 4 with questions/clinical changes. [phone]

## 2023-02-15 NOTE — CONSULT NOTE ADULT - SUBJECTIVE AND OBJECTIVE BOX
**STROKE CODE CONSULT NOTE**    Last known well time: ?11:00 on 2/15    HPI: 72F with pmhx of HTN, HLD, DM, who presents for management of 4 week history of LLE ulcer and cellulitis. Pt states 4 weeks ago, she noticed an ulcer to her LLE. States she may have hit her leg on something, but cannot remember. Reports initially there was pus draining from the area but after a few days, the ulcer dried up. States she initially went to an urgent care 4 weeks ago and was prescribed Keflex 10 days, which she completed a course of. After completing the course, she noticed the erythema and swelling was persistent and revisited the urgent care, who then prescribed her Clindamycin. Patient then took a week of Clindamycin, but due to persistent erythema, she follows with Infectious Disease, Dr. Monroy, who started her on Bactrim. The daughter noticed that over the weekend the patient was reporting generalized fatigue with nausea, so they scheduled a follow up with Dr. Monroy. Pt had a telehealth visit with Dr. Monroy today, who noted the erythema was persistent and he recommended the patient come to the ED for IV antibiotics and a CT scan.  Stroke code was called on 2/15 for altered mental status. On exam, patient unable to answer questions and repeatedly saying "ow". NCHCT negative for hemorrhage or transcortical infarction. CTA head and neck negative for steno-occlusive disease.    T(C): 36.6 (02-15-23 @ 14:29), Max: 37.1 (02-14-23 @ 20:57)  HR: 84 (02-15-23 @ 19:54) (76 - 84)  BP: 177/83 (02-15-23 @ 19:54) (105/72 - 177/83)  RR: 18 (02-15-23 @ 19:54) (16 - 20)  SpO2: 100% (02-15-23 @ 19:54) (94% - 100%)    PAST MEDICAL & SURGICAL HISTORY:  Diabetes      Hypertension      Hyperlipidemia    ROS:   Unable to obtain 2/2 AMS    MEDICATIONS  (STANDING):  atorvastatin 20 milliGRAM(s) Oral at bedtime  dextrose 5%. 1000 milliLiter(s) (50 mL/Hr) IV Continuous <Continuous>  dextrose 5%. 1000 milliLiter(s) (100 mL/Hr) IV Continuous <Continuous>  dextrose 50% Injectable 25 Gram(s) IV Push once  dextrose 50% Injectable 12.5 Gram(s) IV Push once  dextrose 50% Injectable 25 Gram(s) IV Push once  enoxaparin Injectable 40 milliGRAM(s) SubCutaneous every 24 hours  glucagon  Injectable 1 milliGRAM(s) IntraMuscular once  hydrocortisone 2.5% Ointment 1 Application(s) Topical two times a day  insulin glargine Injectable (LANTUS) 5 Unit(s) SubCutaneous at bedtime  insulin lispro (ADMELOG) corrective regimen sliding scale   SubCutaneous Before meals and at bedtime  lactated ringers. 1000 milliLiter(s) (100 mL/Hr) IV Continuous <Continuous>  lisinopril 20 milliGRAM(s) Oral every 24 hours  magnesium sulfate  IVPB 4 Gram(s) IV Intermittent once  meropenem  IVPB 1000 milliGRAM(s) IV Intermittent every 8 hours  vancomycin  IVPB 1000 milliGRAM(s) IV Intermittent every 24 hours    MEDICATIONS  (PRN):  acetaminophen     Tablet .. 650 milliGRAM(s) Oral every 6 hours PRN Moderate Pain (4 - 6)  dextrose Oral Gel 15 Gram(s) Oral once PRN Blood Glucose LESS THAN 70 milliGRAM(s)/deciliter  melatonin 5 milliGRAM(s) Oral at bedtime PRN Insomnia    Allergies    No Known Allergies    Intolerances      Vital Signs Last 24 Hrs  T(C): 36.6 (15 Feb 2023 14:29), Max: 37.1 (14 Feb 2023 20:57)  T(F): 97.9 (15 Feb 2023 14:29), Max: 98.8 (14 Feb 2023 20:57)  HR: 84 (15 Feb 2023 19:54) (76 - 84)  BP: 177/83 (15 Feb 2023 19:54) (105/72 - 177/83)  BP(mean): 119 (15 Feb 2023 19:54) (119 - 119)  RR: 18 (15 Feb 2023 19:54) (16 - 20)  SpO2: 100% (15 Feb 2023 19:54) (94% - 100%)    Parameters below as of 15 Feb 2023 19:54  Patient On (Oxygen Delivery Method): room air      Neurologic:  -Mental status: Lethargic, cannot follow commands or state name. Repeatedly saying "ow".   -Cranial nerves:   II: + blink to threat bilaterally.   III, IV, VI: Pupils equally round and reactive to light  VII: Face appears symmetrical.   Motor: Hypertonic in all four extremities. Able to briefly maintain bilateral upper extremities antigravity, at least 3/5.   Sensation: Grossly intact, grimaces to noxious stimuli in all 4 extremities.   Coordination: Unable to perform    NIHSS: 15    Fingerstick Blood Glucose: CAPILLARY BLOOD GLUCOSE      POCT Blood Glucose.: 176 mg/dL (15 Feb 2023 17:11)    LABS:                        10.9   5.09  )-----------( 89       ( 15 Feb 2023 05:30 )             33.5     02-15    136  |  102  |  11  ----------------------------<  193<H>  4.9   |  24  |  0.39<L>    Ca    8.4      15 Feb 2023 17:40  Phos  2.5     02-15  Mg     1.5     02-15    TPro  6.0  /  Alb  2.8<L>  /  TBili  0.5  /  DBili  x   /  AST  72<H>  /  ALT  99<H>  /  AlkPhos  87  02-15    PT/INR - ( 15 Feb 2023 17:40 )   PT: 12.1 sec;   INR: 1.02          PTT - ( 15 Feb 2023 17:40 )  PTT:26.2 sec      RADIOLOGY & ADDITIONAL STUDIES:    < from: CT Brain Stroke Protocol (02.15.23 @ 17:53) >  IMPRESSION: No intracranial hemorrhage or acute transcortical infarct.    < end of copied text >    < from: CT Angio Neck w/ IV Cont (02.15.23 @ 17:56) >  IMPRESSION: No large vessel occlusion.    < end of copied text >  < from: CT Angio Neck w/ IV Cont (02.15.23 @ 17:56) >  IMPRESSION: Normal CTA of the neck. Findings compatible with goiter with   tracheal deviation and narrowing.    < end of copied text >    -----------------------------------------------------------------------------------------------------------------  IV-tPA (Y/N):   N                             Reason IV-tPA not given: out of time frame    **STROKE CODE CONSULT NOTE**    Last known well time: ?11:00 on 2/15    HPI: 72F with pmhx of HTN, HLD, DM, who presents for management of 4 week history of LLE ulcer and cellulitis. Pt states 4 weeks ago, she noticed an ulcer to her LLE. States she may have hit her leg on something, but cannot remember. Reports initially there was pus draining from the area but after a few days, the ulcer dried up. States she initially went to an urgent care 4 weeks ago and was prescribed Keflex 10 days, which she completed a course of. After completing the course, she noticed the erythema and swelling was persistent and revisited the urgent care, who then prescribed her Clindamycin. Patient then took a week of Clindamycin, but due to persistent erythema, she follows with Infectious Disease, Dr. Monroy, who started her on Bactrim. The daughter noticed that over the weekend the patient was reporting generalized fatigue with nausea, so they scheduled a follow up with Dr. Monroy. Pt had a telehealth visit with Dr. Monroy today, who noted the erythema was persistent and he recommended the patient come to the ED for IV antibiotics and a CT scan.  Stroke code was called on 2/15 for altered mental status. On exam, patient unable to answer questions and repeatedly saying "ow". Appears encephalopathic and noted to have rigidity in all 4 extremities with associated myoclonic movements vs. rigors. NCHCT negative for hemorrhage or transcortical infarction. CTA head and neck negative for steno-occlusive disease.    T(C): 36.6 (02-15-23 @ 14:29), Max: 37.1 (02-14-23 @ 20:57)  HR: 84 (02-15-23 @ 19:54) (76 - 84)  BP: 177/83 (02-15-23 @ 19:54) (105/72 - 177/83)  RR: 18 (02-15-23 @ 19:54) (16 - 20)  SpO2: 100% (02-15-23 @ 19:54) (94% - 100%)    PAST MEDICAL & SURGICAL HISTORY:  Diabetes      Hypertension      Hyperlipidemia    ROS:   Unable to obtain 2/2 AMS    MEDICATIONS  (STANDING):  atorvastatin 20 milliGRAM(s) Oral at bedtime  dextrose 5%. 1000 milliLiter(s) (50 mL/Hr) IV Continuous <Continuous>  dextrose 5%. 1000 milliLiter(s) (100 mL/Hr) IV Continuous <Continuous>  dextrose 50% Injectable 25 Gram(s) IV Push once  dextrose 50% Injectable 12.5 Gram(s) IV Push once  dextrose 50% Injectable 25 Gram(s) IV Push once  enoxaparin Injectable 40 milliGRAM(s) SubCutaneous every 24 hours  glucagon  Injectable 1 milliGRAM(s) IntraMuscular once  hydrocortisone 2.5% Ointment 1 Application(s) Topical two times a day  insulin glargine Injectable (LANTUS) 5 Unit(s) SubCutaneous at bedtime  insulin lispro (ADMELOG) corrective regimen sliding scale   SubCutaneous Before meals and at bedtime  lactated ringers. 1000 milliLiter(s) (100 mL/Hr) IV Continuous <Continuous>  lisinopril 20 milliGRAM(s) Oral every 24 hours  magnesium sulfate  IVPB 4 Gram(s) IV Intermittent once  meropenem  IVPB 1000 milliGRAM(s) IV Intermittent every 8 hours  vancomycin  IVPB 1000 milliGRAM(s) IV Intermittent every 24 hours    MEDICATIONS  (PRN):  acetaminophen     Tablet .. 650 milliGRAM(s) Oral every 6 hours PRN Moderate Pain (4 - 6)  dextrose Oral Gel 15 Gram(s) Oral once PRN Blood Glucose LESS THAN 70 milliGRAM(s)/deciliter  melatonin 5 milliGRAM(s) Oral at bedtime PRN Insomnia    Allergies    No Known Allergies    Intolerances      Vital Signs Last 24 Hrs  T(C): 36.6 (15 Feb 2023 14:29), Max: 37.1 (14 Feb 2023 20:57)  T(F): 97.9 (15 Feb 2023 14:29), Max: 98.8 (14 Feb 2023 20:57)  HR: 84 (15 Feb 2023 19:54) (76 - 84)  BP: 177/83 (15 Feb 2023 19:54) (105/72 - 177/83)  BP(mean): 119 (15 Feb 2023 19:54) (119 - 119)  RR: 18 (15 Feb 2023 19:54) (16 - 20)  SpO2: 100% (15 Feb 2023 19:54) (94% - 100%)    Parameters below as of 15 Feb 2023 19:54  Patient On (Oxygen Delivery Method): room air      Neurologic:  -Mental status: Lethargic, cannot follow commands or state name. Repeatedly saying "ow".   -Cranial nerves:   II: + blink to threat bilaterally.   III, IV, VI: Pupils equally round and reactive to light  VII: Face appears symmetrical.   Motor: Hypertonic in all four extremities. Able to briefly maintain bilateral upper extremities antigravity, at least 3/5. Bilateral lower extremities able to maintain briefly off of bed when held up by examiner and released.   Sensation: Grossly intact, grimaces to noxious stimuli in all 4 extremities.   Coordination: Unable to perform    NIHSS: 15    Fingerstick Blood Glucose: CAPILLARY BLOOD GLUCOSE      POCT Blood Glucose.: 176 mg/dL (15 Feb 2023 17:11)    LABS:                        10.9   5.09  )-----------( 89       ( 15 Feb 2023 05:30 )             33.5     02-15    136  |  102  |  11  ----------------------------<  193<H>  4.9   |  24  |  0.39<L>    Ca    8.4      15 Feb 2023 17:40  Phos  2.5     02-15  Mg     1.5     02-15    TPro  6.0  /  Alb  2.8<L>  /  TBili  0.5  /  DBili  x   /  AST  72<H>  /  ALT  99<H>  /  AlkPhos  87  02-15    PT/INR - ( 15 Feb 2023 17:40 )   PT: 12.1 sec;   INR: 1.02          PTT - ( 15 Feb 2023 17:40 )  PTT:26.2 sec      RADIOLOGY & ADDITIONAL STUDIES:    < from: CT Brain Stroke Protocol (02.15.23 @ 17:53) >  IMPRESSION: No intracranial hemorrhage or acute transcortical infarct.    < end of copied text >    < from: CT Angio Neck w/ IV Cont (02.15.23 @ 17:56) >  IMPRESSION: No large vessel occlusion.    < end of copied text >  < from: CT Angio Neck w/ IV Cont (02.15.23 @ 17:56) >  IMPRESSION: Normal CTA of the neck. Findings compatible with goiter with   tracheal deviation and narrowing.    < end of copied text >    -----------------------------------------------------------------------------------------------------------------  IV-tPA (Y/N):   N                             Reason IV-tPA not given: out of time frame

## 2023-02-15 NOTE — DIETITIAN INITIAL EVALUATION ADULT - NS FNS DIET ORDER
Diet, Consistent Carbohydrate w/Evening Snack:   Kosher  Supplement Feeding Modality:  Oral  Probiotic Yogurt/Smoothie Cans or Servings Per Day:  1       Frequency:  Two Times a day (02-14-23 @ 13:42)

## 2023-02-15 NOTE — PROGRESS NOTE ADULT - SUBJECTIVE AND OBJECTIVE BOX
24 hr events: : d/c CTX, switched to Vanc/Cefepime per Dr. Monroy, confirmed with ID pharmacist. No surgical intervention per surgery, CT w/o abscess or evidence of OM, LFTs uptrending (maybe from abx?). phos repleted 30mmol NaPhos. ESR elevated 50.  o/n: leg pain overnight, ordered acetaminophen 650      SUBJECTIVE: Patient seen at bedside in no acute distress. Endorsing some pain from LLE, No CP, SOB, fevers or chills    Vital Signs Last 24 Hrs  T(C): 36.7 (15 Feb 2023 09:01), Max: 37.1 (14 Feb 2023 20:57)  T(F): 98 (15 Feb 2023 09:01), Max: 98.8 (14 Feb 2023 20:57)  HR: 82 (15 Feb 2023 09:01) (76 - 82)  BP: 158/87 (15 Feb 2023 09:01) (107/64 - 158/87)  BP(mean): --  RR: 18 (15 Feb 2023 09:01) (16 - 18)  SpO2: 97% (15 Feb 2023 09:01) (96% - 97%)    Parameters below as of 15 Feb 2023 09:01  Patient On (Oxygen Delivery Method): room air        Physical Exam:  General: NAD, resting comfortably in bed  Pulm: Nonlabored breathing, no respiratory distress  Abd: soft, non distended  Extrem: WWP, LLE erythema with area of eschar, non tender, no purulence. Palpable DP and pop on LLE    Lines/drains/tubes:    I&O's Summary      LABS:                        10.9   5.09  )-----------( 89       ( 15 Feb 2023 05:30 )             33.5     02-15    134<L>  |  102  |  13  ----------------------------<  239<H>  4.5   |  26  |  0.52    Ca    8.7      15 Feb 2023 05:30  Phos  2.8     02-15  Mg     1.6     02-15    TPro  6.1  /  Alb  2.8<L>  /  TBili  0.6  /  DBili  x   /  AST  102<H>  /  ALT  115<H>  /  AlkPhos  87  02-15    PT/INR - ( 13 Feb 2023 19:51 )   PT: 12.5 sec;   INR: 1.05          PTT - ( 13 Feb 2023 19:51 )  PTT:22.8 sec    CAPILLARY BLOOD GLUCOSE      POCT Blood Glucose.: 218 mg/dL (15 Feb 2023 13:10)  POCT Blood Glucose.: 200 mg/dL (15 Feb 2023 09:08)  POCT Blood Glucose.: 214 mg/dL (14 Feb 2023 22:06)  POCT Blood Glucose.: 163 mg/dL (14 Feb 2023 16:57)    LIVER FUNCTIONS - ( 15 Feb 2023 05:30 )  Alb: 2.8 g/dL / Pro: 6.1 g/dL / ALK PHOS: 87 U/L / ALT: 115 U/L / AST: 102 U/L / GGT: x             RADIOLOGY & ADDITIONAL STUDIES:

## 2023-02-15 NOTE — CHART NOTE - NSCHARTNOTEFT_GEN_A_CORE
***Rapid Response Note***    72F with PMH HTN, HLD, DM, presented for for management of 4 week history of LLE ulcer and cellulitis failed outpatient antibiotics, admitted for IV antibiotics. She was started on vancomycin and cefepime on day of admission. Upon admission, she was AAOx3 with normal neuro exam. On 2/15/23 noted to be more lethargic, AAOx2 so cefepime was discontinued. At approximately 1700 on RRT was called for AMS. Upon arrival to bedside, she was not tachycardic, febrile, or hypoxic. Noted to have hypertensive blood pressures. Upon exam. patient was encephalopathic, and unable to follow commands. She was able to respond to pain. Lactate was negative. BMP sent. Stroke code called. CTH negative. After CTH she began to have increased muscle rigidity with generalized shaking c/f rigors vs seizures vs myoclonus. Repeat lactate and blood cultures drawn. CXR ordered. UA ordered. She was stepped up to [facility] for closer monitoring. Upon discussion with Dr. Monroy, her antibiotics were broadened from vanc/CTX to vanc/meropenem. vEEG ordered.     Allergies    No Known Allergies    Intolerances        PAST MEDICAL & SURGICAL HISTORY:  Diabetes      Hypertension      Hyperlipidemia          Vital Signs Last 24 Hrs  T(C): 36.8 (15 Feb 2023 21:46), Max: 36.8 (15 Feb 2023 21:46)  T(F): 98.2 (15 Feb 2023 21:46), Max: 98.2 (15 Feb 2023 21:46)  HR: 86 (15 Feb 2023 20:24) (76 - 86)  BP: 185/77 (15 Feb 2023 20:24) (105/72 - 185/77)  BP(mean): 111 (15 Feb 2023 20:24) (111 - 119)  RR: 18 (15 Feb 2023 20:24) (17 - 20)  SpO2: 99% (15 Feb 2023 20:24) (94% - 100%)    Parameters below as of 15 Feb 2023 20:24  Patient On (Oxygen Delivery Method): room air        ROS: c/o pain but unable to state where. unable to obtain 2/2 encephalopathy                            10.9   5.09  )-----------( 89       ( 15 Feb 2023 05:30 )             33.5     02-15    136  |  102  |  11  ----------------------------<  193<H>  4.9   |  24  |  0.39<L>    Ca    8.4      15 Feb 2023 17:40  Phos  2.5     02-15  Mg     1.5     02-15    TPro  6.0  /  Alb  2.8<L>  /  TBili  0.5  /  DBili  x   /  AST  72<H>  /  ALT  99<H>  /  AlkPhos  87  02-15    ABG - ( 15 Feb 2023 17:30 )  pH, Arterial: 7.52  pH, Blood: x     /  pCO2: 32    /  pO2: 100   / HCO3: 26    / Base Excess: 3.7   /  SaO2: 98.6        .  VITAL SIGNS:  T(C): 36.8 (02-15-23 @ 21:46), Max: 36.8 (02-15-23 @ 21:46)  T(F): 98.2 (02-15-23 @ 21:46), Max: 98.2 (02-15-23 @ 21:46)  HR: 86 (02-15-23 @ 20:24) (76 - 86)  BP: 185/77 (02-15-23 @ 20:24) (105/72 - 185/77)  BP(mean): 111 (02-15-23 @ 20:24) (111 - 119)  RR: 18 (02-15-23 @ 20:24) (17 - 20)  SpO2: 99% (02-15-23 @ 20:24) (94% - 100%)  Wt(kg): --    PHYSICAL EXAM:    Constitutional: agitated; encephalopathic  Head: NC/AT  Eyes: PERRL, anicteric sclera  ENT: no nasal discharge; MMM  Neck: supple; no JVD or thyromegaly  Respiratory: CTA B/L; no W/R/R, no retractions  Cardiac: +S1/S2; RRR; no M/R/G; PMI non-displaced  Gastrointestinal: abdomen soft, NT/ND; no rebound or guarding; +BSx4  Extremities: WWP, no clubbing or cyanosis; no peripheral edema  Musculoskeletal: NROM x4  Vascular: 2+ radial, DP/PT pulses B/L  Dermatologic: skin warm, dry and intact; no rashes, wounds, or scars  Lymphatic: no submandibular or cervical LAD  Neurologic: AAOx0; unable to further assess; no hyperreflexia; increased rigidity particularly in feet       LIVER FUNCTIONS - ( 15 Feb 2023 17:40 )  Alb: 2.8 g/dL / Pro: 6.0 g/dL / ALK PHOS: 87 U/L / ALT: 99 U/L / AST: 72 U/L / GGT: x              PT/INR - ( 15 Feb 2023 17:40 )   PT: 12.1 sec;   INR: 1.02          PTT - ( 15 Feb 2023 17:40 )  PTT:26.2 sec    MEDICATIONS  (STANDING):  atorvastatin 20 milliGRAM(s) Oral at bedtime  dextrose 5%. 1000 milliLiter(s) (50 mL/Hr) IV Continuous <Continuous>  dextrose 5%. 1000 milliLiter(s) (100 mL/Hr) IV Continuous <Continuous>  dextrose 50% Injectable 25 Gram(s) IV Push once  dextrose 50% Injectable 12.5 Gram(s) IV Push once  dextrose 50% Injectable 25 Gram(s) IV Push once  enoxaparin Injectable 40 milliGRAM(s) SubCutaneous every 24 hours  glucagon  Injectable 1 milliGRAM(s) IntraMuscular once  hydrocortisone 2.5% Ointment 1 Application(s) Topical two times a day  insulin glargine Injectable (LANTUS) 5 Unit(s) SubCutaneous at bedtime  insulin lispro (ADMELOG) corrective regimen sliding scale   SubCutaneous Before meals and at bedtime  lactated ringers. 1000 milliLiter(s) (100 mL/Hr) IV Continuous <Continuous>  lisinopril 20 milliGRAM(s) Oral every 24 hours  magnesium sulfate  IVPB 4 Gram(s) IV Intermittent once  meropenem  IVPB 1000 milliGRAM(s) IV Intermittent every 8 hours  vancomycin  IVPB 1000 milliGRAM(s) IV Intermittent every 24 hours    MEDICATIONS  (PRN):  acetaminophen     Tablet .. 650 milliGRAM(s) Oral every 6 hours PRN Moderate Pain (4 - 6)  dextrose Oral Gel 15 Gram(s) Oral once PRN Blood Glucose LESS THAN 70 milliGRAM(s)/deciliter  melatonin 5 milliGRAM(s) Oral at bedtime PRN Insomnia      Assessment- Rapid Response called for AMS in a 72y year old Female with a past medical history of HTN, HLD, DM admitted for cellulitis. DDx includes cefepime related neurotoxicity vs sepsis. Sources of sepsis include LLE cellulitis vs possible acute cholecystitis as patient had increasing LFTs with emesis earlier in the day.    Plan-  - transfer to telemetry for closer monitoring  - c/w vancomycin  - d/c CTX and start meropenem (although this lowers seizure threshold)  - vEEG monitoring  - f/u blood cx, UAX, CXR  - f/u US abd  -   - ***Rapid Response Note***    72F with PMH HTN, HLD, DM, presented for for management of 4 week history of LLE ulcer and cellulitis failed outpatient antibiotics, admitted for IV antibiotics. She was started on vancomycin and cefepime on day of admission. Upon admission, she was AAOx3 with normal neuro exam. On 2/15/23 noted to be more lethargic, AAOx2 so cefepime was discontinued. At approximately 1700 on RRT was called for AMS. Upon arrival to bedside, she was not tachycardic, febrile, or hypoxic. Noted to have hypertensive blood pressures. Upon exam. patient was encephalopathic, and unable to follow commands. She was able to respond to pain. Lactate was negative. BMP sent. Stroke code called. CTH negative. After CTH she began to have increased muscle rigidity with generalized shaking c/f rigors vs seizures vs myoclonus. Repeat lactate and blood cultures drawn. CXR ordered. UA ordered. She was stepped up to [facility] for closer monitoring. Upon discussion with Dr. Monroy, her antibiotics were broadened from vanc/CTX to vanc/meropenem. vEEG ordered.     Allergies    No Known Allergies    Intolerances        PAST MEDICAL & SURGICAL HISTORY:  Diabetes      Hypertension      Hyperlipidemia          Vital Signs Last 24 Hrs  T(C): 36.8 (15 Feb 2023 21:46), Max: 36.8 (15 Feb 2023 21:46)  T(F): 98.2 (15 Feb 2023 21:46), Max: 98.2 (15 Feb 2023 21:46)  HR: 86 (15 Feb 2023 20:24) (76 - 86)  BP: 185/77 (15 Feb 2023 20:24) (105/72 - 185/77)  BP(mean): 111 (15 Feb 2023 20:24) (111 - 119)  RR: 18 (15 Feb 2023 20:24) (17 - 20)  SpO2: 99% (15 Feb 2023 20:24) (94% - 100%)    Parameters below as of 15 Feb 2023 20:24  Patient On (Oxygen Delivery Method): room air        ROS: c/o pain but unable to state where. unable to obtain 2/2 encephalopathy                            10.9   5.09  )-----------( 89       ( 15 Feb 2023 05:30 )             33.5     02-15    136  |  102  |  11  ----------------------------<  193<H>  4.9   |  24  |  0.39<L>    Ca    8.4      15 Feb 2023 17:40  Phos  2.5     02-15  Mg     1.5     02-15    TPro  6.0  /  Alb  2.8<L>  /  TBili  0.5  /  DBili  x   /  AST  72<H>  /  ALT  99<H>  /  AlkPhos  87  02-15    ABG - ( 15 Feb 2023 17:30 )  pH, Arterial: 7.52  pH, Blood: x     /  pCO2: 32    /  pO2: 100   / HCO3: 26    / Base Excess: 3.7   /  SaO2: 98.6        .  VITAL SIGNS:  T(C): 36.8 (02-15-23 @ 21:46), Max: 36.8 (02-15-23 @ 21:46)  T(F): 98.2 (02-15-23 @ 21:46), Max: 98.2 (02-15-23 @ 21:46)  HR: 86 (02-15-23 @ 20:24) (76 - 86)  BP: 185/77 (02-15-23 @ 20:24) (105/72 - 185/77)  BP(mean): 111 (02-15-23 @ 20:24) (111 - 119)  RR: 18 (02-15-23 @ 20:24) (17 - 20)  SpO2: 99% (02-15-23 @ 20:24) (94% - 100%)  Wt(kg): --    PHYSICAL EXAM:    Constitutional: agitated; encephalopathic  Head: NC/AT  Eyes: PERRL, anicteric sclera  ENT: no nasal discharge; MMM  Neck: supple; no JVD or thyromegaly  Respiratory: CTA B/L; no W/R/R, no retractions  Cardiac: +S1/S2; RRR; no M/R/G; PMI non-displaced  Gastrointestinal: abdomen soft, NT/ND; no rebound or guarding; +BSx4  Extremities: WWP, no clubbing or cyanosis; no peripheral edema  Musculoskeletal: NROM x4  Vascular: 2+ radial, DP/PT pulses B/L  Dermatologic: skin warm, dry and intact; no rashes, wounds, or scars  Lymphatic: no submandibular or cervical LAD  Neurologic: AAOx0; unable to further assess; no hyperreflexia; increased rigidity particularly in feet       LIVER FUNCTIONS - ( 15 Feb 2023 17:40 )  Alb: 2.8 g/dL / Pro: 6.0 g/dL / ALK PHOS: 87 U/L / ALT: 99 U/L / AST: 72 U/L / GGT: x              PT/INR - ( 15 Feb 2023 17:40 )   PT: 12.1 sec;   INR: 1.02          PTT - ( 15 Feb 2023 17:40 )  PTT:26.2 sec    MEDICATIONS  (STANDING):  atorvastatin 20 milliGRAM(s) Oral at bedtime  dextrose 5%. 1000 milliLiter(s) (50 mL/Hr) IV Continuous <Continuous>  dextrose 5%. 1000 milliLiter(s) (100 mL/Hr) IV Continuous <Continuous>  dextrose 50% Injectable 25 Gram(s) IV Push once  dextrose 50% Injectable 12.5 Gram(s) IV Push once  dextrose 50% Injectable 25 Gram(s) IV Push once  enoxaparin Injectable 40 milliGRAM(s) SubCutaneous every 24 hours  glucagon  Injectable 1 milliGRAM(s) IntraMuscular once  hydrocortisone 2.5% Ointment 1 Application(s) Topical two times a day  insulin glargine Injectable (LANTUS) 5 Unit(s) SubCutaneous at bedtime  insulin lispro (ADMELOG) corrective regimen sliding scale   SubCutaneous Before meals and at bedtime  lactated ringers. 1000 milliLiter(s) (100 mL/Hr) IV Continuous <Continuous>  lisinopril 20 milliGRAM(s) Oral every 24 hours  magnesium sulfate  IVPB 4 Gram(s) IV Intermittent once  meropenem  IVPB 1000 milliGRAM(s) IV Intermittent every 8 hours  vancomycin  IVPB 1000 milliGRAM(s) IV Intermittent every 24 hours    MEDICATIONS  (PRN):  acetaminophen     Tablet .. 650 milliGRAM(s) Oral every 6 hours PRN Moderate Pain (4 - 6)  dextrose Oral Gel 15 Gram(s) Oral once PRN Blood Glucose LESS THAN 70 milliGRAM(s)/deciliter  melatonin 5 milliGRAM(s) Oral at bedtime PRN Insomnia      Assessment- Rapid Response called for AMS in a 72y year old Female with a past medical history of HTN, HLD, DM admitted for cellulitis. DDx includes cefepime related neurotoxicity vs sepsis. Sources of sepsis include LLE cellulitis vs possible acute cholecystitis as patient had increasing LFTs with emesis earlier in the day.    Plan-  - transfer to telemetry for closer monitoring  - c/w vancomycin  - d/c CTX and start meropenem per ID (although this lowers seizure threshold)  - vEEG monitoring  - f/u blood cx, UAX, CXR  - f/u US abd    Case d/w Dr. Mcpherson. ***Rapid Response Note***    72F with PMH HTN, HLD, DM, presented for for management of 4 week history of LLE ulcer and cellulitis failed outpatient antibiotics, admitted for IV antibiotics. She was started on vancomycin and cefepime on day of admission. Upon admission, she was AAOx3 with normal neuro exam. On 2/15/23 noted to be more lethargic, AAOx2 so cefepime was discontinued. At approximately 1700 on RRT was called for AMS. Upon arrival to bedside, she was not tachycardic, febrile, or hypoxic. Noted to have hypertensive blood pressures. Upon exam. patient was encephalopathic, and unable to follow commands. She was able to respond to pain. POCT glucose performed. Lactate was negative. BMP sent. Stroke code called. CTH negative. After CTH she began to have increased muscle rigidity with generalized shaking c/f rigors vs seizures vs myoclonus. Repeat lactate and blood cultures drawn. CXR ordered. UA ordered. She was stepped up to [facility] for closer monitoring. Upon discussion with Dr. Monroy, her antibiotics were broadened from vanc/CTX to vanc/meropenem. vEEG ordered.     Allergies    No Known Allergies    Intolerances        PAST MEDICAL & SURGICAL HISTORY:  Diabetes      Hypertension      Hyperlipidemia          Vital Signs Last 24 Hrs  T(C): 36.8 (15 Feb 2023 21:46), Max: 36.8 (15 Feb 2023 21:46)  T(F): 98.2 (15 Feb 2023 21:46), Max: 98.2 (15 Feb 2023 21:46)  HR: 86 (15 Feb 2023 20:24) (76 - 86)  BP: 185/77 (15 Feb 2023 20:24) (105/72 - 185/77)  BP(mean): 111 (15 Feb 2023 20:24) (111 - 119)  RR: 18 (15 Feb 2023 20:24) (17 - 20)  SpO2: 99% (15 Feb 2023 20:24) (94% - 100%)    Parameters below as of 15 Feb 2023 20:24  Patient On (Oxygen Delivery Method): room air        ROS: c/o pain but unable to state where. unable to obtain 2/2 encephalopathy                            10.9   5.09  )-----------( 89       ( 15 Feb 2023 05:30 )             33.5     02-15    136  |  102  |  11  ----------------------------<  193<H>  4.9   |  24  |  0.39<L>    Ca    8.4      15 Feb 2023 17:40  Phos  2.5     02-15  Mg     1.5     02-15    TPro  6.0  /  Alb  2.8<L>  /  TBili  0.5  /  DBili  x   /  AST  72<H>  /  ALT  99<H>  /  AlkPhos  87  02-15    ABG - ( 15 Feb 2023 17:30 )  pH, Arterial: 7.52  pH, Blood: x     /  pCO2: 32    /  pO2: 100   / HCO3: 26    / Base Excess: 3.7   /  SaO2: 98.6        .  VITAL SIGNS:  T(C): 36.8 (02-15-23 @ 21:46), Max: 36.8 (02-15-23 @ 21:46)  T(F): 98.2 (02-15-23 @ 21:46), Max: 98.2 (02-15-23 @ 21:46)  HR: 86 (02-15-23 @ 20:24) (76 - 86)  BP: 185/77 (02-15-23 @ 20:24) (105/72 - 185/77)  BP(mean): 111 (02-15-23 @ 20:24) (111 - 119)  RR: 18 (02-15-23 @ 20:24) (17 - 20)  SpO2: 99% (02-15-23 @ 20:24) (94% - 100%)  Wt(kg): --    PHYSICAL EXAM:    Constitutional: agitated; encephalopathic  Head: NC/AT  Eyes: PERRL, anicteric sclera  ENT: no nasal discharge; MMM  Neck: supple; no JVD or thyromegaly  Respiratory: CTA B/L; no W/R/R, no retractions  Cardiac: +S1/S2; RRR; no M/R/G; PMI non-displaced  Gastrointestinal: abdomen soft, NT/ND; no rebound or guarding; +BSx4  Extremities: WWP, no clubbing or cyanosis; no peripheral edema  Musculoskeletal: NROM x4  Vascular: 2+ radial, DP/PT pulses B/L  Dermatologic: skin warm, dry and intact; no rashes, wounds, or scars  Lymphatic: no submandibular or cervical LAD  Neurologic: AAOx0; unable to further assess; no hyperreflexia; increased rigidity particularly in feet       LIVER FUNCTIONS - ( 15 Feb 2023 17:40 )  Alb: 2.8 g/dL / Pro: 6.0 g/dL / ALK PHOS: 87 U/L / ALT: 99 U/L / AST: 72 U/L / GGT: x              PT/INR - ( 15 Feb 2023 17:40 )   PT: 12.1 sec;   INR: 1.02          PTT - ( 15 Feb 2023 17:40 )  PTT:26.2 sec    MEDICATIONS  (STANDING):  atorvastatin 20 milliGRAM(s) Oral at bedtime  dextrose 5%. 1000 milliLiter(s) (50 mL/Hr) IV Continuous <Continuous>  dextrose 5%. 1000 milliLiter(s) (100 mL/Hr) IV Continuous <Continuous>  dextrose 50% Injectable 25 Gram(s) IV Push once  dextrose 50% Injectable 12.5 Gram(s) IV Push once  dextrose 50% Injectable 25 Gram(s) IV Push once  enoxaparin Injectable 40 milliGRAM(s) SubCutaneous every 24 hours  glucagon  Injectable 1 milliGRAM(s) IntraMuscular once  hydrocortisone 2.5% Ointment 1 Application(s) Topical two times a day  insulin glargine Injectable (LANTUS) 5 Unit(s) SubCutaneous at bedtime  insulin lispro (ADMELOG) corrective regimen sliding scale   SubCutaneous Before meals and at bedtime  lactated ringers. 1000 milliLiter(s) (100 mL/Hr) IV Continuous <Continuous>  lisinopril 20 milliGRAM(s) Oral every 24 hours  magnesium sulfate  IVPB 4 Gram(s) IV Intermittent once  meropenem  IVPB 1000 milliGRAM(s) IV Intermittent every 8 hours  vancomycin  IVPB 1000 milliGRAM(s) IV Intermittent every 24 hours    MEDICATIONS  (PRN):  acetaminophen     Tablet .. 650 milliGRAM(s) Oral every 6 hours PRN Moderate Pain (4 - 6)  dextrose Oral Gel 15 Gram(s) Oral once PRN Blood Glucose LESS THAN 70 milliGRAM(s)/deciliter  melatonin 5 milliGRAM(s) Oral at bedtime PRN Insomnia      Assessment- Rapid Response called for AMS in a 72y year old Female with a past medical history of HTN, HLD, DM admitted for cellulitis. DDx includes cefepime related neurotoxicity vs sepsis. Sources of sepsis include LLE cellulitis vs possible acute cholecystitis as patient had increasing LFTs with emesis earlier in the day.    Plan-  - transfer to telemetry for closer monitoring  - c/w vancomycin  - d/c CTX and start meropenem per ID (although this lowers seizure threshold)  - vEEG monitoring  - f/u blood cx, UAX, CXR  - f/u US abd    Case d/w Dr. Mcpherson.

## 2023-02-15 NOTE — PROGRESS NOTE ADULT - PROBLEM SELECTOR PLAN 2
Patient experienced an episode of nausea and non-biliious vomiting ISO disorientation and worsened cellulitis, likely 2/2 cefepime.  - d/c cefepime  - RUQ ultrasound  - zofran 4g, reassess Patient experienced an episode of nausea and non-biliious vomiting ISO disorientation and worsened cellulitis, likely 2/2 cefepime.  - d/c cefepime  - RUQ ultrasound  - zofran 4g Patient experienced an episode of nausea and non-biliious vomiting ISO disorientation and worsened cellulitis, likely 2/2 cefepime.  - d/c cefepime  - RUQ ultrasound i/s/o uptrending LFTs, emesis, and mild abdominal pain  - IV zofran prn Patient experienced an episode of nausea and non-biliious vomiting ISO disorientation and worsened cellulitis, likely 2/2 cefepime.    - d/c cefepime  - RUQ ultrasound i/s/o uptrending LFTs, emesis, and mild abdominal pain  - IV zofran prn

## 2023-02-15 NOTE — PROGRESS NOTE ADULT - ASSESSMENT
Nausea most likely due to Cefipime    r/o an non infectious etiology of erythema  ie pyoderma gangrenosum

## 2023-02-15 NOTE — PROGRESS NOTE ADULT - SUBJECTIVE AND OBJECTIVE BOX
CC: Patient is a 72y old  Female who presents with a chief complaint of CELLULITIS OF LEFT LEG    Transfer note from [email or web address] to [facility]:   72F with pmhx of HTN, HLD, DM, who presents for management of 4 week history of LLE ulcer and cellulitis after failing PO abx. Started on vancomycin and CTX. Received 3 doses cefepime between 2/14 and 2/15. On 2/15 noted to have increased lethargy. Cefepime stopped. Rapid response called for AMS. Stroke code called. CTH negative. Stepped up to [facility] for closer monitoring. Pt started on Meropenem for progressively worsening mental status.     INTERVAL EVENTS: ARA  SUBJECTIVE / INTERVAL HPI: Patient seen and examined at bedside. Pt somnolent, moans to sternal rub and manipulation of body during examination. Not responding to questioning. Family at bedside (Edith, daughter), exclaiming pt's mental status has declined over the course of the day, but denies SOB and cough, palpitations and CP, headaches, fevers, chills, nausea, vomiting, diarrhea, constipation on behalf of her mother.   ROS: negative unless otherwise stated above.    VITAL SIGNS:  Vital Signs Last 24 Hrs  T(C): 36.6 (15 Feb 2023 14:29), Max: 37.1 (14 Feb 2023 20:57)  T(F): 97.9 (15 Feb 2023 14:29), Max: 98.8 (14 Feb 2023 20:57)  HR: 84 (15 Feb 2023 19:54) (76 - 84)  BP: 177/83 (15 Feb 2023 19:54) (105/72 - 177/83)  BP(mean): 119 (15 Feb 2023 19:54) (119 - 119)  RR: 18 (15 Feb 2023 19:54) (16 - 20)  SpO2: 100% (15 Feb 2023 19:54) (94% - 100%)    Parameters below as of 15 Feb 2023 19:54  Patient On (Oxygen Delivery Method): room air            PHYSICAL EXAM:  General: Alert and oriented x 0. No acute distress. Not cachectic appearing.   Eyes: EOMI. Anicteric.  HEENT: Dry mucous membranes. No scleral icterus. No cervical lymphadenopathy.  Lungs: Clear to auscultation bilaterally. No accessory muscle use.  Cardiovascular: Regular rate and rhythm. No murmur. No JVD.  Abdomen: Soft, non-tender and non-distended. No palpable masses.  Extremities: No edema. Non-tender.   Skin: + ~ 4-5 cm eschar on the L lateral shin. Surrounding area of erythema which expands ~ 4 cm past margins of eschar. No fluctuance, purulence, crepitus appreciated.   Neurologic: Pt is moving all extremities against gravity. Moaning to sternal rub. Fatigued.       MEDICATIONS:  MEDICATIONS  (STANDING):  atorvastatin 20 milliGRAM(s) Oral at bedtime  dextrose 5%. 1000 milliLiter(s) (50 mL/Hr) IV Continuous <Continuous>  dextrose 5%. 1000 milliLiter(s) (100 mL/Hr) IV Continuous <Continuous>  dextrose 50% Injectable 25 Gram(s) IV Push once  dextrose 50% Injectable 12.5 Gram(s) IV Push once  dextrose 50% Injectable 25 Gram(s) IV Push once  enoxaparin Injectable 40 milliGRAM(s) SubCutaneous every 24 hours  glucagon  Injectable 1 milliGRAM(s) IntraMuscular once  hydrocortisone 2.5% Ointment 1 Application(s) Topical two times a day  insulin glargine Injectable (LANTUS) 5 Unit(s) SubCutaneous at bedtime  insulin lispro (ADMELOG) corrective regimen sliding scale   SubCutaneous Before meals and at bedtime  lactated ringers. 1000 milliLiter(s) (100 mL/Hr) IV Continuous <Continuous>  lisinopril 20 milliGRAM(s) Oral every 24 hours  magnesium sulfate  IVPB 4 Gram(s) IV Intermittent once  meropenem  IVPB 1000 milliGRAM(s) IV Intermittent every 8 hours  vancomycin  IVPB 1000 milliGRAM(s) IV Intermittent every 24 hours    MEDICATIONS  (PRN):  acetaminophen     Tablet .. 650 milliGRAM(s) Oral every 6 hours PRN Moderate Pain (4 - 6)  dextrose Oral Gel 15 Gram(s) Oral once PRN Blood Glucose LESS THAN 70 milliGRAM(s)/deciliter  melatonin 5 milliGRAM(s) Oral at bedtime PRN Insomnia      ALLERGIES:  Allergies    No Known Allergies    Intolerances        LABS:                        10.9   5.09  )-----------( 89       ( 15 Feb 2023 05:30 )             33.5     02-15    136  |  102  |  11  ----------------------------<  193<H>  4.9   |  24  |  0.39<L>    Ca    8.4      15 Feb 2023 17:40  Phos  2.5     02-15  Mg     1.5     02-15    TPro  6.0  /  Alb  2.8<L>  /  TBili  0.5  /  DBili  x   /  AST  72<H>  /  ALT  99<H>  /  AlkPhos  87  02-15    PT/INR - ( 15 Feb 2023 17:40 )   PT: 12.1 sec;   INR: 1.02          PTT - ( 15 Feb 2023 17:40 )  PTT:26.2 sec    CAPILLARY BLOOD GLUCOSE      POCT Blood Glucose.: 176 mg/dL (15 Feb 2023 17:11)      RADIOLOGY & ADDITIONAL TESTS: Reviewed. CC: Patient is a 72y old  Female who presents with a chief complaint of CELLULITIS OF LEFT LEG    [facility]   72F with pmhx of HTN, HLD, DM, who presents for management of 4 week history of LLE ulcer and cellulitis after failing PO abx. Started on vancomycin and CTX. Received 3 doses cefepime between 2/14 and 2/15. On 2/15 noted to have increased lethargy. Cefepime stopped. Rapid response called for AMS. Stroke code called. 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 Pt started on Meropenem for progressively worsening mental status, suspecting AMS 2/2 Cefepime medication.    INTERVAL EVENTS: ARA  SUBJECTIVE / INTERVAL HPI: Patient seen and examined at bedside. Pt somnolent, moans to sternal rub and manipulation of body during examination. Not responding to questioning. Family at bedside (Edith, daughter), exclaiming pt's mental status has declined over the course of the day, but denies SOB and cough, palpitations and CP, headaches, fevers, chills, nausea, vomiting, diarrhea, constipation on behalf of her mother.   ROS: negative unless otherwise stated above.    VITAL SIGNS:  Vital Signs Last 24 Hrs  T(C): 36.6 (15 Feb 2023 14:29), Max: 37.1 (14 Feb 2023 20:57)  T(F): 97.9 (15 Feb 2023 14:29), Max: 98.8 (14 Feb 2023 20:57)  HR: 84 (15 Feb 2023 19:54) (76 - 84)  BP: 177/83 (15 Feb 2023 19:54) (105/72 - 177/83)  BP(mean): 119 (15 Feb 2023 19:54) (119 - 119)  RR: 18 (15 Feb 2023 19:54) (16 - 20)  SpO2: 100% (15 Feb 2023 19:54) (94% - 100%)    Parameters below as of 15 Feb 2023 19:54  Patient On (Oxygen Delivery Method): room air            PHYSICAL EXAM:  General: Alert and oriented x 0. No acute distress. Not cachectic appearing.   Eyes: EOMI. Anicteric.  HEENT: Dry mucous membranes. No scleral icterus. No cervical lymphadenopathy.  Lungs: Clear to auscultation bilaterally. No accessory muscle use.  Cardiovascular: Regular rate and rhythm. No murmur. No JVD.  Abdomen: Soft, non-tender and non-distended. No palpable masses.  Extremities: No edema. Non-tender.   Skin: + ~ 4-5 cm eschar on the L lateral shin. Surrounding area of erythema which expands ~ 4 cm past margins of eschar. No fluctuance, purulence, crepitus appreciated.   Neurologic: Pt is moving all extremities against gravity. Moaning to sternal rub. Fatigued.       MEDICATIONS:  MEDICATIONS  (STANDING):  atorvastatin 20 milliGRAM(s) Oral at bedtime  dextrose 5%. 1000 milliLiter(s) (50 mL/Hr) IV Continuous <Continuous>  dextrose 5%. 1000 milliLiter(s) (100 mL/Hr) IV Continuous <Continuous>  dextrose 50% Injectable 25 Gram(s) IV Push once  dextrose 50% Injectable 12.5 Gram(s) IV Push once  dextrose 50% Injectable 25 Gram(s) IV Push once  enoxaparin Injectable 40 milliGRAM(s) SubCutaneous every 24 hours  glucagon  Injectable 1 milliGRAM(s) IntraMuscular once  hydrocortisone 2.5% Ointment 1 Application(s) Topical two times a day  insulin glargine Injectable (LANTUS) 5 Unit(s) SubCutaneous at bedtime  insulin lispro (ADMELOG) corrective regimen sliding scale   SubCutaneous Before meals and at bedtime  lactated ringers. 1000 milliLiter(s) (100 mL/Hr) IV Continuous <Continuous>  lisinopril 20 milliGRAM(s) Oral every 24 hours  magnesium sulfate  IVPB 4 Gram(s) IV Intermittent once  meropenem  IVPB 1000 milliGRAM(s) IV Intermittent every 8 hours  vancomycin  IVPB 1000 milliGRAM(s) IV Intermittent every 24 hours    MEDICATIONS  (PRN):  acetaminophen     Tablet .. 650 milliGRAM(s) Oral every 6 hours PRN Moderate Pain (4 - 6)  dextrose Oral Gel 15 Gram(s) Oral once PRN Blood Glucose LESS THAN 70 milliGRAM(s)/deciliter  melatonin 5 milliGRAM(s) Oral at bedtime PRN Insomnia      ALLERGIES:  Allergies    No Known Allergies    Intolerances        LABS:                        10.9   5.09  )-----------( 89       ( 15 Feb 2023 05:30 )             33.5     02-15    136  |  102  |  11  ----------------------------<  193<H>  4.9   |  24  |  0.39<L>    Ca    8.4      15 Feb 2023 17:40  Phos  2.5     02-15  Mg     1.5     02-15    TPro  6.0  /  Alb  2.8<L>  /  TBili  0.5  /  DBili  x   /  AST  72<H>  /  ALT  99<H>  /  AlkPhos  87  02-15    PT/INR - ( 15 Feb 2023 17:40 )   PT: 12.1 sec;   INR: 1.02          PTT - ( 15 Feb 2023 17:40 )  PTT:26.2 sec    CAPILLARY BLOOD GLUCOSE      POCT Blood Glucose.: 176 mg/dL (15 Feb 2023 17:11)      RADIOLOGY & ADDITIONAL TESTS: Reviewed. CC: Patient is a 72y old  Female who presents with a chief complaint of CELLULITIS OF LEFT LEG    [facility]   72F with pmhx of HTN, HLD, DM, who presents for management of 4 week history of LLE ulcer and cellulitis after failing PO abx. Started on vancomycin and CTX. Received 3 doses cefepime between 2/14 and 2/15. On 2/15 noted to have increased lethargy. Cefepime stopped. Rapid response called for AMS. Stroke code called. 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 Pt started on Meropenem for progressively worsening mental status, suspecting AMS 2/2 Cefepime medication.    INTERVAL EVENTS: ARA  SUBJECTIVE / INTERVAL HPI: Patient seen and examined at bedside. Pt somnolent, moans to sternal rub and manipulation of body during examination. Not responding to questioning. Family at bedside (Edith, daughter), exclaiming pt's mental status has declined over the course of the day, but denies SOB and cough, palpitations and CP, headaches, fevers, chills, nausea, vomiting, diarrhea, constipation on behalf of her mother.   Asked Lay about medication list for her mother. Lay says she understands her mother to be on metformin, Insulin, Cholesterol medication, and a medication for assistance with urination, however unclear dose.     VITAL SIGNS:  Vital Signs Last 24 Hrs  T(C): 36.6 (15 Feb 2023 14:29), Max: 37.1 (14 Feb 2023 20:57)  T(F): 97.9 (15 Feb 2023 14:29), Max: 98.8 (14 Feb 2023 20:57)  HR: 84 (15 Feb 2023 19:54) (76 - 84)  BP: 177/83 (15 Feb 2023 19:54) (105/72 - 177/83)  BP(mean): 119 (15 Feb 2023 19:54) (119 - 119)  RR: 18 (15 Feb 2023 19:54) (16 - 20)  SpO2: 100% (15 Feb 2023 19:54) (94% - 100%)    Parameters below as of 15 Feb 2023 19:54  Patient On (Oxygen Delivery Method): room air            PHYSICAL EXAM:  General: Alert and oriented x 0. No acute distress. Not cachectic appearing.   Eyes: EOMI. Anicteric.  HEENT: Dry mucous membranes. No scleral icterus. Diffuse anterior cervical swelling.   Lungs: Clear to auscultation bilaterally. No accessory muscle use.  Cardiovascular: Regular rate and rhythm. No murmur. No JVD.  Abdomen: Soft, non-tender and non-distended. No palpable masses.  Extremities: No edema. Non-tender.   Skin: + ~ 4-5 cm eschar on the L lateral shin. Surrounding area of erythema which expands ~ 4 cm past margins of eschar. No fluctuance, purulence, crepitus appreciated.   Neurologic: Pt is moving all extremities against gravity. Moaning to sternal rub. Fatigued.       MEDICATIONS:  MEDICATIONS  (STANDING):  atorvastatin 20 milliGRAM(s) Oral at bedtime  dextrose 5%. 1000 milliLiter(s) (50 mL/Hr) IV Continuous <Continuous>  dextrose 5%. 1000 milliLiter(s) (100 mL/Hr) IV Continuous <Continuous>  dextrose 50% Injectable 25 Gram(s) IV Push once  dextrose 50% Injectable 12.5 Gram(s) IV Push once  dextrose 50% Injectable 25 Gram(s) IV Push once  enoxaparin Injectable 40 milliGRAM(s) SubCutaneous every 24 hours  glucagon  Injectable 1 milliGRAM(s) IntraMuscular once  hydrocortisone 2.5% Ointment 1 Application(s) Topical two times a day  insulin glargine Injectable (LANTUS) 5 Unit(s) SubCutaneous at bedtime  insulin lispro (ADMELOG) corrective regimen sliding scale   SubCutaneous Before meals and at bedtime  lactated ringers. 1000 milliLiter(s) (100 mL/Hr) IV Continuous <Continuous>  lisinopril 20 milliGRAM(s) Oral every 24 hours  magnesium sulfate  IVPB 4 Gram(s) IV Intermittent once  meropenem  IVPB 1000 milliGRAM(s) IV Intermittent every 8 hours  vancomycin  IVPB 1000 milliGRAM(s) IV Intermittent every 24 hours    MEDICATIONS  (PRN):  acetaminophen     Tablet .. 650 milliGRAM(s) Oral every 6 hours PRN Moderate Pain (4 - 6)  dextrose Oral Gel 15 Gram(s) Oral once PRN Blood Glucose LESS THAN 70 milliGRAM(s)/deciliter  melatonin 5 milliGRAM(s) Oral at bedtime PRN Insomnia      ALLERGIES:  Allergies    No Known Allergies    Intolerances        LABS:                        10.9   5.09  )-----------( 89       ( 15 Feb 2023 05:30 )             33.5     02-15    136  |  102  |  11  ----------------------------<  193<H>  4.9   |  24  |  0.39<L>    Ca    8.4      15 Feb 2023 17:40  Phos  2.5     02-15  Mg     1.5     02-15    TPro  6.0  /  Alb  2.8<L>  /  TBili  0.5  /  DBili  x   /  AST  72<H>  /  ALT  99<H>  /  AlkPhos  87  02-15    PT/INR - ( 15 Feb 2023 17:40 )   PT: 12.1 sec;   INR: 1.02          PTT - ( 15 Feb 2023 17:40 )  PTT:26.2 sec    CAPILLARY BLOOD GLUCOSE      POCT Blood Glucose.: 176 mg/dL (15 Feb 2023 17:11)      RADIOLOGY & ADDITIONAL TESTS: Reviewed.

## 2023-02-15 NOTE — PROGRESS NOTE ADULT - PROBLEM SELECTOR PLAN 1
Pt presenting for a 4 week history of persistent LLE ulcer and cellulitis. States she does not know how she got the wound, may have had trauma to the area but is unsure. Followed up with urgent care; took a 10 day course of Keflex which did not improve the symptoms, followed by one week of Clindamycin. Given persistent symptoms, patient followed with ID, Dr. Monroy outpatient, who prescribed Bactrim. After one week of Bactrim, pt had a follow up visit today and given persistent erythema, Dr. Monroy recommended admission for IV antibiotics. Pt denies fevers/chills at home, not septic on admission. On physical exam, 3x3 cm area of eschar with surrounding erythema. No tenderness, fluctuance or purulence. ESR, CRP mildly elevated, XR LLE, CT LLE: cellulitis, no evidence of OM. General surgery consulted in the ED, no surgical interventions advised. Pt is s/p 1g ceftriaxone   Plan:  - c/w Vancomycin 1g q24h (started 2/13, trough before 4th dose on 2/16)  - d/c cefepime 2g q8h due to n/v on 2/15, worsening of LE wound  - broadened to meropenem with Dr. Monroy approval   - f/u BCx - NGTD   - f/u dermatology recs: warm compresses BID, desonide 0.05% ointment BID, and possible short prednisone taper starting at 60mg going down by 5mg every day.  - f/u ID recs  - D-dimer Pt presenting for a 4 week history of persistent LLE ulcer and cellulitis. States she does not know how she got the wound, may have had trauma to the area but is unsure. Followed up with urgent care; took a 10 day course of Keflex which did not improve the symptoms, followed by one week of Clindamycin. Given persistent symptoms, patient followed with ID, Dr. Monroy outpatient, who prescribed Bactrim. After one week of Bactrim, pt had a follow up visit today and given persistent erythema, Dr. Monroy recommended admission for IV antibiotics. Pt denies fevers/chills at home, not septic on admission. On physical exam, 3x3 cm area of eschar with surrounding erythema. No tenderness, fluctuance or purulence. ESR, CRP mildly elevated, XR LLE, CT LLE: cellulitis, no evidence of OM. General surgery consulted in the ED, no surgical interventions advised. Pt is s/p 1g ceftriaxone. D-Dimer elevated 2/15 to 654.   Plan:  - D/c cefepime 2g q8h due to n/v on 2/15, worsening of LE wound  - c/w Vancomycin 1g q24h (started 2/13, trough before 4th dose on 2/16)  - broadened to meropenem with Dr. Monroy approval  - doxycycline 100mg q12h started for broadened microbial coverage   - f/u BCx - NGTD   - f/u dermatology recs: warm compresses BID, desonide 0.05% ointment BID, and possible short prednisone taper starting at 60mg going down by 5mg every day.  - f/u ID recs

## 2023-02-15 NOTE — DIETITIAN INITIAL EVALUATION ADULT - PERTINENT MEDS FT
MEDICATIONS  (STANDING):  atorvastatin 20 milliGRAM(s) Oral at bedtime  dextrose 5%. 1000 milliLiter(s) (50 mL/Hr) IV Continuous <Continuous>  dextrose 5%. 1000 milliLiter(s) (100 mL/Hr) IV Continuous <Continuous>  dextrose 50% Injectable 25 Gram(s) IV Push once  dextrose 50% Injectable 12.5 Gram(s) IV Push once  dextrose 50% Injectable 25 Gram(s) IV Push once  enoxaparin Injectable 40 milliGRAM(s) SubCutaneous every 24 hours  gabapentin 100 milliGRAM(s) Oral every 8 hours  glucagon  Injectable 1 milliGRAM(s) IntraMuscular once  insulin glargine Injectable (LANTUS) 5 Unit(s) SubCutaneous at bedtime  insulin lispro (ADMELOG) corrective regimen sliding scale   SubCutaneous Before meals and at bedtime  lisinopril 20 milliGRAM(s) Oral every 24 hours  vancomycin  IVPB 1000 milliGRAM(s) IV Intermittent every 24 hours    MEDICATIONS  (PRN):  acetaminophen     Tablet .. 650 milliGRAM(s) Oral every 6 hours PRN Moderate Pain (4 - 6)  dextrose Oral Gel 15 Gram(s) Oral once PRN Blood Glucose LESS THAN 70 milliGRAM(s)/deciliter  melatonin 5 milliGRAM(s) Oral at bedtime PRN Insomnia

## 2023-02-15 NOTE — DIETITIAN INITIAL EVALUATION ADULT - OTHER CALCULATIONS
Based on Standards of Care pt within % IBW (107%) thus actual body weight used for all calculations (139.7#). Needs adjusted for advanced age.

## 2023-02-15 NOTE — PROGRESS NOTE ADULT - PROBLEM SELECTOR PLAN 7
Home meds:  rosuvastatin 5mg QD  Plan:   - c/w home meds Home meds: rosuvastatin 5mg QD  Plan:   - c/w home meds Pt reportedly taking home meds: rosuvastatin 5mg QD  Plan:   - c/w home meds when pt stable

## 2023-02-15 NOTE — DIETITIAN INITIAL EVALUATION ADULT - OTHER INFO
72F with pmhx of HTN, HLD, DM, who presents for management of 4 week history of LLE ulcer and cellulitis. Pt states 4 weeks ago, she noticed an ulcer to her LLE. States she may have hit her leg on something, but cannot remember. Reports initially there was pus draining from the area but after a few days, the ulcer dried up. States she initially went to an urgent care 4 weeks ago and was prescribed Keflex 10 days, which she completed a course of. After completing the course, she noticed the erythema and swelling was persistent and revisited the urgent care, who then prescribed her Clindamycin. Patient then took a week of Clindamycin, but due to persistent erythema, she follows with Infectious Disease, Dr. Monroy, who started her on Bactrim. The daughter noticed that over the weekend the patient was reporting generalized fatigue with nausea, so they scheduled a follow up with Dr. Monroy today. Pt had a telehealth visit with Dr. Monroy today, who noted the erythema was persistent and he recommended the patient come to the ED for IV antibiotics and a CT scan. Pt denies fevers, chills at home, denies pain to the lower extremities, had mild GI upset over the weekend but no longer has abdominal pain, no n/v/d. Denies hx of similar symptoms in the past.    Patient and daughter seen at bedside for nutrition consult. Conducted RD interview with daughter d/t pt sleeping and not feeling well. Current diet order: consistent carbohydrates, Kosher. NKFA. No difficulty chewing/swallowing reported. Daughter reports pt had good PO intake PTA but appetite decreased ~1 day PTA. Pt hadn't consumed breakfast yet because she had episodes of nausea/vomiting yesterday and today which daughter reports is likely a reaction to abx. Dosing weight: 139.7 pounds, BMI 22.6, daughter unsure of UBW. Luis Miguel score 19. 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 No edema documented. Labs: elevated BS, HgbA1c 8.9%-insulin regimen as ordered. Observed with no overt signs and symptoms of muscle or fat wasting. Based on ASPEN guidelines, pt does not meet criteria for malnutrition. No cultural ethnic, Yarsani food preferences noted. Pt made aware RD remains available. See nutrition recommendations below.

## 2023-02-15 NOTE — PROGRESS NOTE ADULT - PROBLEM SELECTOR PLAN 1
Pt presenting for a 4 week history of persistent LLE ulcer and cellulitis. States she does not know how she got the wound, may have had trauma to the area but is unsure. Followed up with urgent care; took a 10 day course of Keflex which did not improve the symptoms, followed by one week of Clindamycin. Given persistent symptoms, patient followed with ID, Dr. Monroy outpatient, who prescribed Bactrim. After one week of Bactrim, pt had a follow up visit today and given persistent erythema, Dr. Monroy recommended admission for IV antibiotics. Pt denies fevers/chills at home, not septic on admission. On physical exam, 3x3 cm area of eschar with surrounding erythema. No tenderness, fluctuance or purulence    ESR, CRP mildly elevated, XR LLE, CT LLE: cellulitis, no evidence of OM    - general surgery consulted in the ED, no surgical interventions  - s/p 1g ceftriaxone     Plan:  - c/w Vancomycin 1g q24h (started 2/13, trough before 4th dose on 2/16)  - d/c cefepime 2g q8h due to n/v, worsening of LE wound  - broaden to meropenem  - f/u BCx - NGTD   - f/u dermatology recs: warm compresses BID, desonide 0.05% ointment BID, and possible short prednisone taper starting at 60mg going down by 5mg every day.  - f/u ID recs  - D-dimer

## 2023-02-15 NOTE — PROGRESS NOTE ADULT - SUBJECTIVE AND OBJECTIVE BOX
INTERVAL HPI/OVERNIGHT EVENTS:  No o/n events.   This morning, the patient was seen resting comfortably on 6L NC. Continues to complain of cough and diarrhea with one episode of green sputum this morning. Denies any current SOB.  ROS otherwise negative.     Patient's PCP is Dr. Donna Morejon; cardiologist is Dr. Jeb Williamson at [facility]; urologist is Dr. Paulo Pitts at [facility]    Patient uses 2 pharmacies:     VITAL SIGNS:  T(F): 98 (02-15-23 @ 09:01)  HR: 82 (02-15-23 @ 09:01)  BP: 158/87 (02-15-23 @ 09:01)  RR: 18 (02-15-23 @ 09:01)  SpO2: 97% (02-15-23 @ 09:01)  Wt(kg): --        PHYSICAL EXAM:    Constitutional: resting comfortably in bed; NAD  HEENT: NC/AT, PER, anicteric sclera, no nasal discharge; MMM  Neck: supple; no JVD or thyromegaly  Respiratory: CTA B/L; no W/R/R, no retractions  Cardiac: +S1/S2; RRR; no M/R/G  Gastrointestinal: soft, NT/ND; no rebound or guarding  Back: spine midline, no bony tenderness or step-offs; no CVAT B/L  Extremities: WWP, no clubbing or cyanosis; no peripheral edema  Musculoskeletal: NROM x4; no joint swelling, tenderness or erythema  Vascular: 2+ radial, DP/PT pulses B/L  Dermatologic: skin warm, dry and intact; no rashes, wounds, or scars  Neurologic: AAOx3; CNII-XII grossly intact; no focal deficits  Psychiatric: affect and characteristics of appearance, verbalizations, behaviors are appropriate    MEDICATIONS  (STANDING):  atorvastatin 20 milliGRAM(s) Oral at bedtime  cefepime   IVPB 2000 milliGRAM(s) IV Intermittent every 8 hours  dextrose 5%. 1000 milliLiter(s) (50 mL/Hr) IV Continuous <Continuous>  dextrose 5%. 1000 milliLiter(s) (100 mL/Hr) IV Continuous <Continuous>  dextrose 50% Injectable 25 Gram(s) IV Push once  dextrose 50% Injectable 12.5 Gram(s) IV Push once  dextrose 50% Injectable 25 Gram(s) IV Push once  enoxaparin Injectable 40 milliGRAM(s) SubCutaneous every 24 hours  gabapentin 100 milliGRAM(s) Oral every 8 hours  glucagon  Injectable 1 milliGRAM(s) IntraMuscular once  insulin lispro (ADMELOG) corrective regimen sliding scale   SubCutaneous Before meals and at bedtime  lisinopril 20 milliGRAM(s) Oral every 24 hours  vancomycin  IVPB 1000 milliGRAM(s) IV Intermittent every 24 hours    MEDICATIONS  (PRN):  acetaminophen     Tablet .. 650 milliGRAM(s) Oral every 6 hours PRN Moderate Pain (4 - 6)  dextrose Oral Gel 15 Gram(s) Oral once PRN Blood Glucose LESS THAN 70 milliGRAM(s)/deciliter  melatonin 5 milliGRAM(s) Oral at bedtime PRN Insomnia      Allergies    No Known Allergies    Intolerances        LABS:                        10.9   5.09  )-----------( 89       ( 15 Feb 2023 05:30 )             33.5     02-15    134<L>  |  102  |  13  ----------------------------<  239<H>  4.5   |  26  |  0.52    Ca    8.7      15 Feb 2023 05:30  Phos  2.8     02-15  Mg     1.6     02-15    TPro  6.1  /  Alb  2.8<L>  /  TBili  0.6  /  DBili  x   /  AST  102<H>  /  ALT  115<H>  /  AlkPhos  87  02-15    PT/INR - ( 13 Feb 2023 19:51 )   PT: 12.5 sec;   INR: 1.05          PTT - ( 13 Feb 2023 19:51 )  PTT:22.8 sec      RADIOLOGY & ADDITIONAL TESTS:  Reviewed INTERVAL HPI/OVERNIGHT EVENTS:      VITAL SIGNS:  T(F): 98 (02-15-23 @ 09:01)  HR: 82 (02-15-23 @ 09:01)  BP: 158/87 (02-15-23 @ 09:01)  RR: 18 (02-15-23 @ 09:01)  SpO2: 97% (02-15-23 @ 09:01)  Wt(kg): --        PHYSICAL EXAM:    Constitutional: resting comfortably in bed; NAD  HEENT: NC/AT, PER, anicteric sclera, no nasal discharge; MMM  Neck: supple; no JVD or thyromegaly  Respiratory: CTA B/L; no W/R/R, no retractions  Cardiac: +S1/S2; RRR; no M/R/G  Gastrointestinal: soft, NT/ND; no rebound or guarding  Back: spine midline, no bony tenderness or step-offs; no CVAT B/L  Extremities: WWP, no clubbing or cyanosis; no peripheral edema  Musculoskeletal: NROM x4; no joint swelling, tenderness or erythema  Vascular: 2+ radial, DP/PT pulses B/L  Dermatologic: skin warm, dry and intact; no rashes, wounds, or scars  Neurologic: AAOx3; CNII-XII grossly intact; no focal deficits  Psychiatric: affect and characteristics of appearance, verbalizations, behaviors are appropriate    MEDICATIONS  (STANDING):  atorvastatin 20 milliGRAM(s) Oral at bedtime  cefepime   IVPB 2000 milliGRAM(s) IV Intermittent every 8 hours  dextrose 5%. 1000 milliLiter(s) (50 mL/Hr) IV Continuous <Continuous>  dextrose 5%. 1000 milliLiter(s) (100 mL/Hr) IV Continuous <Continuous>  dextrose 50% Injectable 25 Gram(s) IV Push once  dextrose 50% Injectable 12.5 Gram(s) IV Push once  dextrose 50% Injectable 25 Gram(s) IV Push once  enoxaparin Injectable 40 milliGRAM(s) SubCutaneous every 24 hours  gabapentin 100 milliGRAM(s) Oral every 8 hours  glucagon  Injectable 1 milliGRAM(s) IntraMuscular once  insulin lispro (ADMELOG) corrective regimen sliding scale   SubCutaneous Before meals and at bedtime  lisinopril 20 milliGRAM(s) Oral every 24 hours  vancomycin  IVPB 1000 milliGRAM(s) IV Intermittent every 24 hours    MEDICATIONS  (PRN):  acetaminophen     Tablet .. 650 milliGRAM(s) Oral every 6 hours PRN Moderate Pain (4 - 6)  dextrose Oral Gel 15 Gram(s) Oral once PRN Blood Glucose LESS THAN 70 milliGRAM(s)/deciliter  melatonin 5 milliGRAM(s) Oral at bedtime PRN Insomnia      Allergies    No Known Allergies    Intolerances        LABS:                        10.9   5.09  )-----------( 89       ( 15 Feb 2023 05:30 )             33.5     02-15    134<L>  |  102  |  13  ----------------------------<  239<H>  4.5   |  26  |  0.52    Ca    8.7      15 Feb 2023 05:30  Phos  2.8     02-15  Mg     1.6     02-15    TPro  6.1  /  Alb  2.8<L>  /  TBili  0.6  /  DBili  x   /  AST  102<H>  /  ALT  115<H>  /  AlkPhos  87  02-15    PT/INR - ( 13 Feb 2023 19:51 )   PT: 12.5 sec;   INR: 1.05          PTT - ( 13 Feb 2023 19:51 )  PTT:22.8 sec      RADIOLOGY & ADDITIONAL TESTS:  Reviewed INTERVAL HPI/OVERNIGHT EVENTS:  Overnight, the patient was complaining of burning pain BLE; improved somewhat with acetaminophen 650mg.  This morning, she continues to complain of bilateral leg pain. She has no other complaints at this time.     Later in the AM, the patient had an episode of nausea and non-bilious yellow emesis and was disoriented. She was A&Ox3 but repeatedly gave her  instead of today's date when prompted.    VITAL SIGNS:  T(F): 98 (02-15-23 @ 09:01)  HR: 82 (02-15-23 @ 09:01)  BP: 158/87 (02-15-23 @ 09:01)  RR: 18 (02-15-23 @ 09:01)  SpO2: 97% (02-15-23 @ 09:01)  Wt(kg): --        PHYSICAL EXAM:    Constitutional: resting comfortably in bed; NAD  HEENT: NC/AT, PER, anicteric sclera, no nasal discharge;  Neck: supple  Respiratory: CTA B/L; no W/R/R, no retractions  Cardiac: +S1/S2; RRR; no M/R/G  Gastrointestinal: soft, NT/ND; no rebound or guarding  Extremities: WWP, no clubbing or cyanosis; no peripheral edema; 3x3 wound with eschar and surrounding erythema on lateral aspect of LLE, worse than yesterday; no TTP, no active drainage, no crepitus  Musculoskeletal: NROM x4; no joint swelling, tenderness or erythema  Vascular: 2+ radial, DP/PT pulses B/L  Dermatologic: skin warm, dry and intact; no rashes, wounds, or scars  Neurologic: AAOx3; CNII-XII grossly intact; no focal deficits  Psychiatric: affect and characteristics of appearance, verbalizations, behaviors are appropriate    MEDICATIONS  (STANDING):  atorvastatin 20 milliGRAM(s) Oral at bedtime  cefepime   IVPB 2000 milliGRAM(s) IV Intermittent every 8 hours  dextrose 5%. 1000 milliLiter(s) (50 mL/Hr) IV Continuous <Continuous>  dextrose 5%. 1000 milliLiter(s) (100 mL/Hr) IV Continuous <Continuous>  dextrose 50% Injectable 25 Gram(s) IV Push once  dextrose 50% Injectable 12.5 Gram(s) IV Push once  dextrose 50% Injectable 25 Gram(s) IV Push once  enoxaparin Injectable 40 milliGRAM(s) SubCutaneous every 24 hours  gabapentin 100 milliGRAM(s) Oral every 8 hours  glucagon  Injectable 1 milliGRAM(s) IntraMuscular once  insulin lispro (ADMELOG) corrective regimen sliding scale   SubCutaneous Before meals and at bedtime  lisinopril 20 milliGRAM(s) Oral every 24 hours  vancomycin  IVPB 1000 milliGRAM(s) IV Intermittent every 24 hours    MEDICATIONS  (PRN):  acetaminophen     Tablet .. 650 milliGRAM(s) Oral every 6 hours PRN Moderate Pain (4 - 6)  dextrose Oral Gel 15 Gram(s) Oral once PRN Blood Glucose LESS THAN 70 milliGRAM(s)/deciliter  melatonin 5 milliGRAM(s) Oral at bedtime PRN Insomnia      Allergies    No Known Allergies    Intolerances        LABS:                        10.9   5.09  )-----------( 89       ( 15 Feb 2023 05:30 )             33.5     02-15    134<L>  |  102  |  13  ----------------------------<  239<H>  4.5   |  26  |  0.52    Ca    8.7      15 Feb 2023 05:30  Phos  2.8     02-15  Mg     1.6     02-15    TPro  6.1  /  Alb  2.8<L>  /  TBili  0.6  /  DBili  x   /  AST  102<H>  /  ALT  115<H>  /  AlkPhos  87  02-15    PT/INR - ( 2023 19:51 )   PT: 12.5 sec;   INR: 1.05          PTT - ( 2023 19:51 )  PTT:22.8 sec      RADIOLOGY & ADDITIONAL TESTS:  Reviewed INTERVAL HPI/OVERNIGHT EVENTS:  Overnight, the patient was complaining of burning pain BLE; improved somewhat with acetaminophen 650mg.  This morning, she continues to complain of bilateral burning leg pain. She has no other complaints at this time.     Later in the AM, the patient had an episode of nausea and non-bilious yellow emesis and was disoriented. She was A&Ox3 but repeatedly gave her  instead of today's date when prompted.    VITAL SIGNS:  T(F): 98 (02-15-23 @ 09:01)  HR: 82 (02-15-23 @ 09:01)  BP: 158/87 (02-15-23 @ 09:01)  RR: 18 (02-15-23 @ 09:01)  SpO2: 97% (02-15-23 @ 09:01)  Wt(kg): --        PHYSICAL EXAM:    Constitutional: resting comfortably in bed; NAD  HEENT: NC/AT, PER, anicteric sclera, no nasal discharge;  Neck: supple  Respiratory: CTA B/L; no W/R/R, no retractions  Cardiac: +S1/S2; RRR; no M/R/G  Gastrointestinal: soft, NT/ND; no rebound or guarding  Extremities: WWP, no clubbing or cyanosis; no peripheral edema; 3x3 wound with eschar and surrounding erythema on lateral aspect of LLE, worse than yesterday; no TTP, no active drainage, no crepitus  Musculoskeletal: NROM x4; no joint swelling, tenderness or erythema  Vascular: 2+ radial, DP/PT pulses B/L  Dermatologic: skin warm, dry and intact; no rashes, wounds, or scars  Neurologic: AAOx3; CNII-XII grossly intact; no focal deficits  Psychiatric: affect and characteristics of appearance, verbalizations, behaviors are appropriate    MEDICATIONS  (STANDING):  atorvastatin 20 milliGRAM(s) Oral at bedtime  cefepime   IVPB 2000 milliGRAM(s) IV Intermittent every 8 hours  dextrose 5%. 1000 milliLiter(s) (50 mL/Hr) IV Continuous <Continuous>  dextrose 5%. 1000 milliLiter(s) (100 mL/Hr) IV Continuous <Continuous>  dextrose 50% Injectable 25 Gram(s) IV Push once  dextrose 50% Injectable 12.5 Gram(s) IV Push once  dextrose 50% Injectable 25 Gram(s) IV Push once  enoxaparin Injectable 40 milliGRAM(s) SubCutaneous every 24 hours  gabapentin 100 milliGRAM(s) Oral every 8 hours  glucagon  Injectable 1 milliGRAM(s) IntraMuscular once  insulin lispro (ADMELOG) corrective regimen sliding scale   SubCutaneous Before meals and at bedtime  lisinopril 20 milliGRAM(s) Oral every 24 hours  vancomycin  IVPB 1000 milliGRAM(s) IV Intermittent every 24 hours    MEDICATIONS  (PRN):  acetaminophen     Tablet .. 650 milliGRAM(s) Oral every 6 hours PRN Moderate Pain (4 - 6)  dextrose Oral Gel 15 Gram(s) Oral once PRN Blood Glucose LESS THAN 70 milliGRAM(s)/deciliter  melatonin 5 milliGRAM(s) Oral at bedtime PRN Insomnia      Allergies    No Known Allergies    Intolerances        LABS:                        10.9   5.09  )-----------( 89       ( 15 Feb 2023 05:30 )             33.5     02-15    134<L>  |  102  |  13  ----------------------------<  239<H>  4.5   |  26  |  0.52    Ca    8.7      15 Feb 2023 05:30  Phos  2.8     02-15  Mg     1.6     02-15    TPro  6.1  /  Alb  2.8<L>  /  TBili  0.6  /  DBili  x   /  AST  102<H>  /  ALT  115<H>  /  AlkPhos  87  02-15    PT/INR - ( 2023 19:51 )   PT: 12.5 sec;   INR: 1.05          PTT - ( 2023 19:51 )  PTT:22.8 sec      RADIOLOGY & ADDITIONAL TESTS:  Reviewed TRANSFER NOTE    [facility] to [address] [place] [id number] course:    72F with pmhx of HTN, HLD, DM, who presents for management of 4 week history of LLE ulcer and cellulitis after failing PO abx. Started on vancomycin and CTX. Received 3 doses cefepime between  and 2/15. On 2/15 noted to have increased lethargy. Cefepime stopped. Rapid response called for AMS. Stroke code called. CTH negative. Stepped up to [facility] for closer monitoring.    INTERVAL HPI/OVERNIGHT EVENTS:  Overnight, the patient was complaining of burning pain BLE; improved somewhat with acetaminophen 650mg.  This morning, she continues to complain of bilateral burning leg pain. She has no other complaints at this time.     Later in the AM, the patient had an episode of nausea and non-bilious yellow emesis and was disoriented. She was A&Ox3 but repeatedly gave her  instead of today's date when prompted.    VITAL SIGNS:  T(F): 98 (02-15-23 @ 09:01)  HR: 82 (02-15-23 @ 09:01)  BP: 158/87 (02-15-23 @ 09:01)  RR: 18 (02-15-23 @ 09:01)  SpO2: 97% (02-15-23 @ 09:01)  Wt(kg): --        PHYSICAL EXAM:    Constitutional: resting comfortably in bed; NAD  HEENT: NC/AT, PER, anicteric sclera, no nasal discharge;  Neck: supple  Respiratory: CTA B/L; no W/R/R, no retractions  Cardiac: +S1/S2; RRR; no M/R/G  Gastrointestinal: soft, NT/ND; no rebound or guarding  Extremities: WWP, no clubbing or cyanosis; no peripheral edema; 3x3 wound with eschar and surrounding erythema on lateral aspect of LLE, worse than yesterday; no TTP, no active drainage, no crepitus  Musculoskeletal: NROM x4; no joint swelling, tenderness or erythema  Vascular: 2+ radial, DP/PT pulses B/L  Dermatologic: skin warm, dry and intact; no rashes, wounds, or scars  Neurologic: AAOx3; CNII-XII grossly intact; no focal deficits  Psychiatric: affect and characteristics of appearance, verbalizations, behaviors are appropriate    MEDICATIONS  (STANDING):  atorvastatin 20 milliGRAM(s) Oral at bedtime  cefepime   IVPB 2000 milliGRAM(s) IV Intermittent every 8 hours  dextrose 5%. 1000 milliLiter(s) (50 mL/Hr) IV Continuous <Continuous>  dextrose 5%. 1000 milliLiter(s) (100 mL/Hr) IV Continuous <Continuous>  dextrose 50% Injectable 25 Gram(s) IV Push once  dextrose 50% Injectable 12.5 Gram(s) IV Push once  dextrose 50% Injectable 25 Gram(s) IV Push once  enoxaparin Injectable 40 milliGRAM(s) SubCutaneous every 24 hours  gabapentin 100 milliGRAM(s) Oral every 8 hours  glucagon  Injectable 1 milliGRAM(s) IntraMuscular once  insulin lispro (ADMELOG) corrective regimen sliding scale   SubCutaneous Before meals and at bedtime  lisinopril 20 milliGRAM(s) Oral every 24 hours  vancomycin  IVPB 1000 milliGRAM(s) IV Intermittent every 24 hours    MEDICATIONS  (PRN):  acetaminophen     Tablet .. 650 milliGRAM(s) Oral every 6 hours PRN Moderate Pain (4 - 6)  dextrose Oral Gel 15 Gram(s) Oral once PRN Blood Glucose LESS THAN 70 milliGRAM(s)/deciliter  melatonin 5 milliGRAM(s) Oral at bedtime PRN Insomnia      Allergies    No Known Allergies    Intolerances        LABS:                        10.9   5.09  )-----------( 89       ( 15 Feb 2023 05:30 )             33.5     02-15    134<L>  |  102  |  13  ----------------------------<  239<H>  4.5   |  26  |  0.52    Ca    8.7      15 Feb 2023 05:30  Phos  2.8     02-15  Mg     1.6     02-15    TPro  6.1  /  Alb  2.8<L>  /  TBili  0.6  /  DBili  x   /  AST  102<H>  /  ALT  115<H>  /  AlkPhos  87  02-15    PT/INR - ( 2023 19:51 )   PT: 12.5 sec;   INR: 1.05          PTT - ( 2023 19:51 )  PTT:22.8 sec      RADIOLOGY & ADDITIONAL TESTS:  Reviewed

## 2023-02-15 NOTE — PROGRESS NOTE ADULT - PROBLEM SELECTOR PLAN 5
Hgb of 10.6 on admission. Denies melena, hematochezia, hematuria. MCV 83    - iron studies consistent with AOCD, elevated ferritin, low TIBC  - Hgb stable

## 2023-02-15 NOTE — PROGRESS NOTE ADULT - SUBJECTIVE AND OBJECTIVE BOX
INTERVAL HPI/OVERNIGHT EVENTS:    ANTIBIOTICS    MEDICATIONS  (STANDING):  atorvastatin 20 milliGRAM(s) Oral at bedtime  dextrose 5%. 1000 milliLiter(s) (50 mL/Hr) IV Continuous <Continuous>  dextrose 5%. 1000 milliLiter(s) (100 mL/Hr) IV Continuous <Continuous>  dextrose 50% Injectable 25 Gram(s) IV Push once  dextrose 50% Injectable 12.5 Gram(s) IV Push once  dextrose 50% Injectable 25 Gram(s) IV Push once  enoxaparin Injectable 40 milliGRAM(s) SubCutaneous every 24 hours  gabapentin 100 milliGRAM(s) Oral every 8 hours  glucagon  Injectable 1 milliGRAM(s) IntraMuscular once  insulin glargine Injectable (LANTUS) 5 Unit(s) SubCutaneous at bedtime  insulin lispro (ADMELOG) corrective regimen sliding scale   SubCutaneous Before meals and at bedtime  lisinopril 20 milliGRAM(s) Oral every 24 hours  vancomycin  IVPB 1000 milliGRAM(s) IV Intermittent every 24 hours    MEDICATIONS  (PRN):  acetaminophen     Tablet .. 650 milliGRAM(s) Oral every 6 hours PRN Moderate Pain (4 - 6)  dextrose Oral Gel 15 Gram(s) Oral once PRN Blood Glucose LESS THAN 70 milliGRAM(s)/deciliter  melatonin 5 milliGRAM(s) Oral at bedtime PRN Insomnia      Allergies    No Known Allergies    Intolerances        REVIEW OF SYSTEMS:    Constitutional: nausea    Eyes: No eye pain, visual disturbances, or discharge  ENMT:  No difficulty hearing, tinnitus, vertigo; No sinus or throat pain  Neck: No pain or stiffness  Respiratory: No cough, wheezing, chills or hemoptysis  Cardiovascular: No chest pain, palpitations, shortness of breath, dizziness or leg swelling  Gastrointestinal: No abdominal or epigastric pain. No nausea, vomiting or hematemesis; No diarrhea or constipation. No melena or hematochezia.  Genitourinary: No dysuria, frequency, hematuria or incontinence  Rectal: No pain, hemorrhoids or incontinence  Neurological: No headaches, memory loss, loss of strength, numbness or tremors  Skin: No itching, burning, rashes or lesions   Lymph Nodes: No enlarged glands  Endocrine: No heat or cold intolerance; No hair loss      Vital Signs Last 24 Hrs  T(C): 36.7 (15 Feb 2023 09:01), Max: 37.1 (14 Feb 2023 20:57)  T(F): 98 (15 Feb 2023 09:01), Max: 98.8 (14 Feb 2023 20:57)  HR: 82 (15 Feb 2023 09:01) (76 - 82)  BP: 158/87 (15 Feb 2023 09:01) (107/64 - 158/87)  BP(mean): --  RR: 18 (15 Feb 2023 09:01) (16 - 18)  SpO2: 97% (15 Feb 2023 09:01) (96% - 97%)    Parameters below as of 15 Feb 2023 09:01  Patient On (Oxygen Delivery Method): room air        PHYSICAL EXAM:    Head: Normocephalic; atraumatic  ENMT: No nasal discharge; airway clear  Neck: Supple; non tender; no masses  Respiratory: No wheezes, rales or rhonchi  Cardiovascular: Regular rate and rhythm. S1 and S2 Normal; No murmurs, gallops or rubs  Gastrointestinal: Soft non-tender non-distended; Normal bowel sounds; No hepatosplenomegaly  Genitourinary: No costovertebral angle tenderness  Extremities:  area of erythema slightly more pronounced today on leg  Vascular: Peripheral pulses palpable 2+ bilaterally  Neurological: Alert and oriented x3  Skin: Warm and dry. No acute rash  Lymph Nodes: No acute cervical adenopathy  Musculoskeletal: Normal gait, tone, without deformities    LABS:                        10.9   5.09  )-----------( 89       ( 15 Feb 2023 05:30 )             33.5     02-15    134<L>  |  102  |  13  ----------------------------<  239<H>  4.5   |  26  |  0.52    Ca    8.7      15 Feb 2023 05:30  Phos  2.8     02-15  Mg     1.6     02-15    TPro  6.1  /  Alb  2.8<L>  /  TBili  0.6  /  DBili  x   /  AST  102<H>  /  ALT  115<H>  /  AlkPhos  87  02-15    PT/INR - ( 13 Feb 2023 19:51 )   PT: 12.5 sec;   INR: 1.05          PTT - ( 13 Feb 2023 19:51 )  PTT:22.8 sec        MICROBIOLOGY:  Culture Results:   No growth at 1 day. (02-13 @ 19:45)  Culture Results:   No growth at 1 day. (02-13 @ 19:40)      RADIOLOGY & ADDITIONAL STUDIES:

## 2023-02-15 NOTE — PROGRESS NOTE ADULT - PROBLEM SELECTOR PLAN 3
Home meds: Metformin 500mg qD, linagliptin 5mg, and insulin glargine pen 10-20U. A1c 8.9  Plan:   - mISS while inpatient  - start lantus 5U qd  - consistent carb diet On CTA of neck on 2/15, pt noted to have enlargement of the thyroid lobes (right greater than left) as well as the isthmus. The thyroid gland is heterogeneous with a large hypodense dominant nodule within the right lobe with punctate calcifications. The right lobe extends below the sternoclavicular notch. The trachea is deviated to the left with luminal narrowing. These findings may be secondary to underlying goiter. The right innominate and left common carotid artery are displaced. TSH obtained, c/w hypothyroidism. Speaking with daughter Lay, pt has always had goiter at baseline, but has had redness at area developing over past day.   Plan:   - T4 WNL on 2/15, unlikely to be in thyroid storm   -

## 2023-02-15 NOTE — PROGRESS NOTE ADULT - PROBLEM SELECTOR PLAN 2
Patient experienced an episode of nausea and non-biliious vomiting ISO disorientation and worsened cellulitis, likely 2/2 cefepime. No longer experiencing vomiting as of 2/15.   Plan:   - d/c cefepime  - RUQ ultrasound i/s/o uptrending LFTs, emesis, and mild abdominal pain  - IV zofran prn Patient experienced an episode of nausea and non-biliious vomiting ISO disorientation and worsened cellulitis, likely 2/2 cefepime. No longer experiencing vomiting as of 2/15. CTA on 2/15 showing findings compatible with goiter with   tracheal deviation and narrowing. CTH showing no intracranial hemorrhage or acute transcortical infarct.  Plan:   - d/c cefepime  - RUQ ultrasound i/s/o uptrending LFTs, emesis, and mild abdominal pain  - Will d/c zofran d/2 concerns for rigors i/s/o Zofran push during rapid response   - F/u TSH On 2/15, pt was noted to be lethargic, AAOx2 so cefepime was discontinued. At approximately 1700, Rapid response was called for AMS. Upon arrival to bedside, she was not tachycardic, febrile, or hypoxic, but noted to have hypertensive. Upon exam. patient was encephalopathic, and unable to follow commands, responsive to pain. POCT glucose performed, at 180s. Lactate was negative. CTH negative. After CTH she began to have increased muscle rigidity with generalized shaking c/f rigors vs seizures vs myoclonus. Repeat lactate and blood cultures drawn. CXR ordered. UA ordered. She was stepped up to [facility] for closer monitoring. Upon discussion with Dr. Monroy, her antibiotics were broadened from vanc/CTX to vanc/meropenem. vEEG ordered.   Plan:   - d/c cefepime as mentioned above, antimicrobial coverage expanded.   - TSH low, T4 found to be WNL.    - RUQ ultrasound i/s/o uptrending LFTs, emesis, and mild abdominal pain  - Will d/c zofran d/2 concerns for rigors i/s/o Zofran push during rapid response   - LR 60mL/hr started

## 2023-02-15 NOTE — DIETITIAN INITIAL EVALUATION ADULT - PROBLEM SELECTOR PROBLEM 1
Cellulitis of leg possible covid infection?/Current or pending social isolation/Inadequate social supports

## 2023-02-15 NOTE — PROGRESS NOTE ADULT - PROBLEM SELECTOR PLAN 1
Pt presenting for a 4 week history of persistent LLE ulcer and cellulitis. States she does not know how she got the wound, may have had trauma to the area but is unsure. Followed up with urgent care; took a 10 day course of Keflex which did not improve the symptoms, followed by one week of Clindamycin. Given persistent symptoms, patient followed with ID, Dr. Monroy outpatient, who prescribed Bactrim. After one week of Bactrim, pt had a follow up visit today and given persistent erythema, Dr. Monroy recommended admission for IV antibiotics. Pt denies fevers/chills at home, not septic on admission. On physical exam, 3x3 cm area of eschar with surrounding erythema. No tenderness, fluctuance or purulence  - ESR, CRP mildly elevated  - XR LLE, CT LLE: cellulitis, no evidence of OM  - general surgery consulted in the ED, no surgical interventions  - s/p 1g ceftriaxone     Plan:  - c/w Vancomycin 1g q24h (started 2/13, trough before 4th dose on 2/16)  - d/c cefepime 2g q8h due to worsening sx  - f/u BCx - NGTC 12 hrs  - f/u dermatology recs  - wound care  - f/u ID recs Pt presenting for a 4 week history of persistent LLE ulcer and cellulitis. States she does not know how she got the wound, may have had trauma to the area but is unsure. Followed up with urgent care; took a 10 day course of Keflex which did not improve the symptoms, followed by one week of Clindamycin. Given persistent symptoms, patient followed with ID, Dr. Monroy outpatient, who prescribed Bactrim. After one week of Bactrim, pt had a follow up visit today and given persistent erythema, Dr. Monroy recommended admission for IV antibiotics. Pt denies fevers/chills at home, not septic on admission. On physical exam, 3x3 cm area of eschar with surrounding erythema. No tenderness, fluctuance or purulence  - ESR, CRP mildly elevated  - XR LLE, CT LLE: cellulitis, no evidence of OM  - general surgery consulted in the ED, no surgical interventions  - s/p 1g ceftriaxone     Plan:  - c/w Vancomycin 1g q24h (started 2/13, trough before 4th dose on 2/16)  - d/c cefepime 2g q8h due to worsening sx  - f/u BCx - NGTC 12 hrs  - f/u dermatology recs  - wound care  - f/u ID recs  - D-dimer Pt presenting for a 4 week history of persistent LLE ulcer and cellulitis. States she does not know how she got the wound, may have had trauma to the area but is unsure. Followed up with urgent care; took a 10 day course of Keflex which did not improve the symptoms, followed by one week of Clindamycin. Given persistent symptoms, patient followed with ID, Dr. Monroy outpatient, who prescribed Bactrim. After one week of Bactrim, pt had a follow up visit today and given persistent erythema, Dr. Monroy recommended admission for IV antibiotics. Pt denies fevers/chills at home, not septic on admission. On physical exam, 3x3 cm area of eschar with surrounding erythema. No tenderness, fluctuance or purulence    ESR, CRP mildly elevated, XR LLE, CT LLE: cellulitis, no evidence of OM    - general surgery consulted in the ED, no surgical interventions  - s/p 1g ceftriaxone     Plan:  - c/w Vancomycin 1g q24h (started 2/13, trough before 4th dose on 2/16)  - d/c cefepime 2g q8h due to worsening sx  - f/u BCx - NGTC 12 hrs  - f/u dermatology recs  - wound care  - f/u ID recs  - D-dimer Pt presenting for a 4 week history of persistent LLE ulcer and cellulitis. States she does not know how she got the wound, may have had trauma to the area but is unsure. Followed up with urgent care; took a 10 day course of Keflex which did not improve the symptoms, followed by one week of Clindamycin. Given persistent symptoms, patient followed with ID, Dr. Monroy outpatient, who prescribed Bactrim. After one week of Bactrim, pt had a follow up visit today and given persistent erythema, Dr. Monroy recommended admission for IV antibiotics. Pt denies fevers/chills at home, not septic on admission. On physical exam, 3x3 cm area of eschar with surrounding erythema. No tenderness, fluctuance or purulence    ESR, CRP mildly elevated, XR LLE, CT LLE: cellulitis, no evidence of OM    - general surgery consulted in the ED, no surgical interventions  - s/p 1g ceftriaxone     Plan:  - c/w Vancomycin 1g q24h (started 2/13, trough before 4th dose on 2/16)  - d/c cefepime 2g q8h due to n/v, worsening of LE wound  - restart CTX 1g QD  - f/u BCx - NGTD   - f/u dermatology recs: warm compresses BID, desonide 0.05% ointment BID, and possible short prednisone taper starting at 60mg going down by 5mg every day.  - f/u ID recs  - D-dimer

## 2023-02-15 NOTE — DIETITIAN INITIAL EVALUATION ADULT - PERTINENT LABORATORY DATA
02-15    134<L>  |  102  |  13  ----------------------------<  239<H>  4.5   |  26  |  0.52    Ca    8.7      15 Feb 2023 05:30  Phos  2.8     02-15  Mg     1.6     02-15    TPro  6.1  /  Alb  2.8<L>  /  TBili  0.6  /  DBili  x   /  AST  102<H>  /  ALT  115<H>  /  AlkPhos  87  02-15  POCT Blood Glucose.: 200 mg/dL (02-15-23 @ 09:08)  A1C with Estimated Average Glucose Result: 8.9 % (02-14-23 @ 08:10)

## 2023-02-15 NOTE — DIETITIAN INITIAL EVALUATION ADULT - ADD RECOMMEND
1. Continue with diet order   2. Encourage pt to meed nutritional needs as able   3. Monitor labs: electrolytes, cbc, fingersticks   4. Monitor weights   5. F/u with diet education   6. Pain and bowel regimen per team   7. Will continue to assess/honor food preferences as able

## 2023-02-15 NOTE — PROGRESS NOTE ADULT - PROBLEM SELECTOR PLAN 2
Patient experienced an episode of nausea and non-biliious vomiting ISO disorientation and worsened cellulitis, likely 2/2 cefepime.    - d/c cefepime  - RUQ ultrasound i/s/o uptrending LFTs, emesis, and mild abdominal pain  - IV zofran prn

## 2023-02-15 NOTE — PROGRESS NOTE ADULT - SUBJECTIVE AND OBJECTIVE BOX
TRANSFER NOTE    Hospital course:    72F with pmhx of HTN, HLD, DM, who presents for management of 4 week history of LLE ulcer and cellulitis after failing PO abx. Started on vancomycin and CTX. Received 3 doses cefepime between 2/14 and 2/15. On 2/15 noted to have increased lethargy. Cefepime stopped. Rapid response called for AMS. Stroke code called. CTH negative. Stepped up to [facility] for closer monitoring    OVERNIGHT EVENTS: ARA    SUBJECTIVE / INTERVAL HPI: Patient does not participate in interview.     12 point ROS negative other than stated above.     VITAL SIGNS:  Vital Signs Last 24 Hrs  T(C): 36.6 (15 Feb 2023 14:29), Max: 37.1 (14 Feb 2023 20:57)  T(F): 97.9 (15 Feb 2023 14:29), Max: 98.8 (14 Feb 2023 20:57)  HR: 84 (15 Feb 2023 19:54) (76 - 84)  BP: 177/83 (15 Feb 2023 19:54) (105/72 - 177/83)  BP(mean): 119 (15 Feb 2023 19:54) (119 - 119)  RR: 18 (15 Feb 2023 19:54) (16 - 20)  SpO2: 100% (15 Feb 2023 19:54) (94% - 100%)    Parameters below as of 15 Feb 2023 19:54  Patient On (Oxygen Delivery Method): room air        PHYSICAL EXAM:    General: agitated  HEENT: NC/AT, anicteric sclera; MMM  Neck: supple  Cardiovascular: +S1/S2; RRR  Respiratory: CTA B/L; no W/R/R  Gastrointestinal: soft, NT/ND; +BSx4  Extremities: WWP; no edema, clubbing or cyanosis  Vascular: 2+ radial, DP/PT pulses B/L  Neurological: AAOx0; does not follow commands    MEDICATIONS:  MEDICATIONS  (STANDING):  atorvastatin 20 milliGRAM(s) Oral at bedtime  cefTRIAXone   IVPB 1000 milliGRAM(s) IV Intermittent every 24 hours  dextrose 5%. 1000 milliLiter(s) (50 mL/Hr) IV Continuous <Continuous>  dextrose 5%. 1000 milliLiter(s) (100 mL/Hr) IV Continuous <Continuous>  dextrose 50% Injectable 25 Gram(s) IV Push once  dextrose 50% Injectable 12.5 Gram(s) IV Push once  dextrose 50% Injectable 25 Gram(s) IV Push once  enoxaparin Injectable 40 milliGRAM(s) SubCutaneous every 24 hours  glucagon  Injectable 1 milliGRAM(s) IntraMuscular once  hydrocortisone 2.5% Ointment 1 Application(s) Topical two times a day  insulin glargine Injectable (LANTUS) 5 Unit(s) SubCutaneous at bedtime  insulin lispro (ADMELOG) corrective regimen sliding scale   SubCutaneous Before meals and at bedtime  lactated ringers. 1000 milliLiter(s) (100 mL/Hr) IV Continuous <Continuous>  lisinopril 20 milliGRAM(s) Oral every 24 hours  magnesium sulfate  IVPB 4 Gram(s) IV Intermittent once  vancomycin  IVPB 1000 milliGRAM(s) IV Intermittent every 24 hours    MEDICATIONS  (PRN):  acetaminophen     Tablet .. 650 milliGRAM(s) Oral every 6 hours PRN Moderate Pain (4 - 6)  dextrose Oral Gel 15 Gram(s) Oral once PRN Blood Glucose LESS THAN 70 milliGRAM(s)/deciliter  melatonin 5 milliGRAM(s) Oral at bedtime PRN Insomnia      ALLERGIES:  Allergies    No Known Allergies    Intolerances        LABS:                        10.9   5.09  )-----------( 89       ( 15 Feb 2023 05:30 )             33.5     02-15    136  |  102  |  11  ----------------------------<  193<H>  4.9   |  24  |  0.39<L>    Ca    8.4      15 Feb 2023 17:40  Phos  2.5     02-15  Mg     1.5     02-15    TPro  6.0  /  Alb  2.8<L>  /  TBili  0.5  /  DBili  x   /  AST  72<H>  /  ALT  99<H>  /  AlkPhos  87  02-15    PT/INR - ( 15 Feb 2023 17:40 )   PT: 12.1 sec;   INR: 1.02          PTT - ( 15 Feb 2023 17:40 )  PTT:26.2 sec    CAPILLARY BLOOD GLUCOSE      POCT Blood Glucose.: 176 mg/dL (15 Feb 2023 17:11)      RADIOLOGY & ADDITIONAL TESTS: Reviewed.    ASSESSMENT:    PLAN:

## 2023-02-15 NOTE — PROGRESS NOTE ADULT - PROBLEM SELECTOR PLAN 5
Hgb of 10.6 on admission. Denies melena, hematochezia, hematuria. MCV 83  Plan:  - iron studies consistent with AOCD, elevated ferritin, low TIBC  - Hgb stable Pt reportedly taking lisinopril 20mg qd at home  Plan:   - restart lisinopril 20mg  - c/w monitor

## 2023-02-15 NOTE — CONSULT NOTE ADULT - ASSESSMENT
72F with pmhx of HTN, HLD, DM, who presents for management of 4 week history of LLE ulcer and cellulitis. Pt states 4 weeks ago, she noticed an ulcer to her LLE. States she may have hit her leg on something, but cannot remember. Reports initially there was pus draining from the area but after a few days, the ulcer dried up. States she initially went to an urgent care 4 weeks ago and was prescribed Keflex 10 days, which she completed a course of. After completing the course, she noticed the erythema and swelling was persistent and revisited the urgent care, who then prescribed her Clindamycin. Patient then took a week of Clindamycin, but due to persistent erythema, she follows with Infectious Disease, Dr. Monroy, who started her on Bactrim. The daughter noticed that over the weekend the patient was reporting generalized fatigue with nausea, so they scheduled a follow up with Dr. Monroy. Pt had a telehealth visit with Dr. Monroy today, who noted the erythema was persistent and he recommended the patient come to the ED for IV antibiotics and a CT scan.  Stroke code was called on 2/15 for altered mental status. On exam, patient unable to answer questions and repeatedly saying "ow". NCHCT negative for hemorrhage or transcortical infarction. CTA head and neck negative for steno-occlusive disease. NIHSS 15. All extremities noted to be hypertonic with ?myclonic movements.     - high suspicion for cefepime induced neurotoxicity given timing of AMS and exam findings.   - if exam does not improve after cessation of antbx, can consider vEEG or brain MRI without contrast    Discussed with Neurology Attending Dr. Wiliam Tracy 72F with pmhx of HTN, HLD, DM, who presents for management of 4 week history of LLE ulcer and cellulitis. Pt states 4 weeks ago, she noticed an ulcer to her LLE. States she may have hit her leg on something, but cannot remember. Reports initially there was pus draining from the area but after a few days, the ulcer dried up. States she initially went to an urgent care 4 weeks ago and was prescribed Keflex 10 days, which she completed a course of. After completing the course, she noticed the erythema and swelling was persistent and revisited the urgent care, who then prescribed her Clindamycin. Patient then took a week of Clindamycin, but due to persistent erythema, she follows with Infectious Disease, Dr. Monroy, who started her on Bactrim. The daughter noticed that over the weekend the patient was reporting generalized fatigue with nausea, so they scheduled a follow up with Dr. Monroy. Pt had a telehealth visit with Dr. Monroy today, who noted the erythema was persistent and he recommended the patient come to the ED for IV antibiotics and a CT scan.  Stroke code was called on 2/15 for altered mental status. On exam, patient unable to answer questions and repeatedly saying "ow". NCHCT negative for hemorrhage or transcortical infarction. CTA head and neck negative for steno-occlusive disease. NIHSS 15. All extremities noted to be hypertonic with ?myclonic movements.     - high suspicion for cefepime induced neurotoxicity given timing of AMS and exam findings.   - if exam does not improve after cessation of antbx, can consider vEEG +/- brain MRI without contrast    Discussed with Neurology Attending Dr. Wiliam Tracy 72F with pmhx of HTN, HLD, DM, who presents for management of 4 week history of LLE ulcer and cellulitis. Pt states 4 weeks ago, she noticed an ulcer to her LLE. States she may have hit her leg on something, but cannot remember. Reports initially there was pus draining from the area but after a few days, the ulcer dried up. States she initially went to an urgent care 4 weeks ago and was prescribed Keflex 10 days, which she completed a course of. After completing the course, she noticed the erythema and swelling was persistent and revisited the urgent care, who then prescribed her Clindamycin. Patient then took a week of Clindamycin, but due to persistent erythema, she follows with Infectious Disease, Dr. Monroy, who started her on Bactrim. The daughter noticed that over the weekend the patient was reporting generalized fatigue with nausea, so they scheduled a follow up with Dr. Monroy. Pt had a telehealth visit with Dr. Monroy today, who noted the erythema was persistent and he recommended the patient come to the ED for IV antibiotics and a CT scan.  Stroke code was called on 2/15 for altered mental status. On exam, patient unable to answer questions and repeatedly saying "ow". NCHCT negative for hemorrhage or transcortical infarction. CTA head and neck negative for steno-occlusive disease. NIHSS 15. All extremities noted to be hypertonic with ?myclonic movements.     - consider possible cefepime induced neurotoxicity given timing of AMS and exam findings  - if exam does not improve after cessation of antbx, can consider vEEG +/- brain MRI without contrast    Discussed with Neurology Attending Dr. Wiliam Tracy

## 2023-02-15 NOTE — PROGRESS NOTE ADULT - ATTENDING COMMENTS
#LLE cellulitis with eschar:   sp failed outpt oral abx tx x3 w keflex, clinda and bactrim   was started on vanc and rocephin and transitioned to cefepime per ID recs. Pt noted to have nausea, worsening thrombocytopenia, erythema today. ID recs to dc cefepime and consult derm for non infectious work up- > will consult for bx of lesion   #hyponatremia Na 132-> fu repeat bmp- will encourage oral intake-> likely 2/2 poor po intake in the setting of abx use -> NA improved to 134   #uncontrolled DM: a1c 8.9- started on lantus 5 u and cw hss  #transaminitis noted from admission; increase in ast from 45-> 137: will continue to trend and fu RUQ us   #HTN: Lisinopril resumed   dvt ppx as above #LLE cellulitis with eschar due to unknown etiology:   sp failed outpt oral abx tx x3 w keflex, clinda and bactrim   was started on vanc and rocephin and transitioned to cefepime per ID recs. Pt noted to have nausea, worsening thrombocytopenia, erythema today. ID recs to dc cefepime and consult derm for non infectious work up- > will consult for bx of lesion   #hyponatremia Na 132-> fu repeat bmp- will encourage oral intake-> likely 2/2 poor po intake in the setting of abx use -> NA improved to 134   #uncontrolled DMw hyperglycemia: a1c 8.9- started on lantus 5 u and cw hss  #transaminitis noted from admission; increase in ast from 45-> 137: will continue to trend and fu RUQ us, was transiently disoriented to resident, ax3 on physical exam, could be 2/2 acute n/v  #HTN: Lisinopril resumed   dvt ppx as above

## 2023-02-16 DIAGNOSIS — Z29.9 ENCOUNTER FOR PROPHYLACTIC MEASURES, UNSPECIFIED: ICD-10-CM

## 2023-02-16 DIAGNOSIS — L03.116 CELLULITIS OF LEFT LOWER LIMB: ICD-10-CM

## 2023-02-16 DIAGNOSIS — R41.82 ALTERED MENTAL STATUS, UNSPECIFIED: ICD-10-CM

## 2023-02-16 DIAGNOSIS — E87.1 HYPO-OSMOLALITY AND HYPONATREMIA: ICD-10-CM

## 2023-02-16 DIAGNOSIS — E11.9 TYPE 2 DIABETES MELLITUS WITHOUT COMPLICATIONS: ICD-10-CM

## 2023-02-16 DIAGNOSIS — D64.9 ANEMIA, UNSPECIFIED: ICD-10-CM

## 2023-02-16 DIAGNOSIS — E78.5 HYPERLIPIDEMIA, UNSPECIFIED: ICD-10-CM

## 2023-02-16 DIAGNOSIS — I10 ESSENTIAL (PRIMARY) HYPERTENSION: ICD-10-CM

## 2023-02-16 LAB
ALBUMIN SERPL ELPH-MCNC: 2.8 G/DL — LOW (ref 3.3–5)
ALP SERPL-CCNC: 82 U/L — SIGNIFICANT CHANGE UP (ref 40–120)
ALT FLD-CCNC: 72 U/L — HIGH (ref 10–45)
AMPHET UR-MCNC: NEGATIVE — SIGNIFICANT CHANGE UP
ANION GAP SERPL CALC-SCNC: 13 MMOL/L — SIGNIFICANT CHANGE UP (ref 5–17)
ANISOCYTOSIS BLD QL: SLIGHT — SIGNIFICANT CHANGE UP
AST SERPL-CCNC: 36 U/L — SIGNIFICANT CHANGE UP (ref 10–40)
BARBITURATES UR SCN-MCNC: NEGATIVE — SIGNIFICANT CHANGE UP
BASOPHILS # BLD AUTO: 0 K/UL — SIGNIFICANT CHANGE UP (ref 0–0.2)
BASOPHILS # BLD AUTO: 0 K/UL — SIGNIFICANT CHANGE UP (ref 0–0.2)
BASOPHILS NFR BLD AUTO: 0 % — SIGNIFICANT CHANGE UP (ref 0–2)
BASOPHILS NFR BLD AUTO: 0 % — SIGNIFICANT CHANGE UP (ref 0–2)
BENZODIAZ UR-MCNC: NEGATIVE — SIGNIFICANT CHANGE UP
BILIRUB SERPL-MCNC: 0.6 MG/DL — SIGNIFICANT CHANGE UP (ref 0.2–1.2)
BUN SERPL-MCNC: 8 MG/DL — SIGNIFICANT CHANGE UP (ref 7–23)
BURR CELLS BLD QL SMEAR: PRESENT — SIGNIFICANT CHANGE UP
BURR CELLS BLD QL SMEAR: PRESENT — SIGNIFICANT CHANGE UP
CALCIUM SERPL-MCNC: 8.3 MG/DL — LOW (ref 8.4–10.5)
CHLORIDE SERPL-SCNC: 100 MMOL/L — SIGNIFICANT CHANGE UP (ref 96–108)
CO2 SERPL-SCNC: 24 MMOL/L — SIGNIFICANT CHANGE UP (ref 22–31)
COCAINE METAB.OTHER UR-MCNC: NEGATIVE — SIGNIFICANT CHANGE UP
CREAT SERPL-MCNC: 0.31 MG/DL — LOW (ref 0.5–1.3)
CYSTATIN C SERPL-MCNC: 1 MG/L — SIGNIFICANT CHANGE UP (ref 0.68–1.36)
DACRYOCYTES BLD QL SMEAR: SLIGHT — SIGNIFICANT CHANGE UP
EGFR: 111 ML/MIN/1.73M2 — SIGNIFICANT CHANGE UP
EOSINOPHIL # BLD AUTO: 0.04 K/UL — SIGNIFICANT CHANGE UP (ref 0–0.5)
EOSINOPHIL # BLD AUTO: 0.13 K/UL — SIGNIFICANT CHANGE UP (ref 0–0.5)
EOSINOPHIL NFR BLD AUTO: 0.9 % — SIGNIFICANT CHANGE UP (ref 0–6)
EOSINOPHIL NFR BLD AUTO: 2.7 % — SIGNIFICANT CHANGE UP (ref 0–6)
GFR/BSA.PRED SERPLBLD CYS-BASED-ARV: 69 ML/MIN/1.73M2 — SIGNIFICANT CHANGE UP
GIANT PLATELETS BLD QL SMEAR: PRESENT — SIGNIFICANT CHANGE UP
GIANT PLATELETS BLD QL SMEAR: PRESENT — SIGNIFICANT CHANGE UP
GLUCOSE BLDC GLUCOMTR-MCNC: 156 MG/DL — HIGH (ref 70–99)
GLUCOSE BLDC GLUCOMTR-MCNC: 176 MG/DL — HIGH (ref 70–99)
GLUCOSE BLDC GLUCOMTR-MCNC: 178 MG/DL — HIGH (ref 70–99)
GLUCOSE BLDC GLUCOMTR-MCNC: 295 MG/DL — HIGH (ref 70–99)
GLUCOSE SERPL-MCNC: 297 MG/DL — HIGH (ref 70–99)
HCT VFR BLD CALC: 31.3 % — LOW (ref 34.5–45)
HGB BLD-MCNC: 10.3 G/DL — LOW (ref 11.5–15.5)
LYMPHOCYTES # BLD AUTO: 1.83 K/UL — SIGNIFICANT CHANGE UP (ref 1–3.3)
LYMPHOCYTES # BLD AUTO: 1.92 K/UL — SIGNIFICANT CHANGE UP (ref 1–3.3)
LYMPHOCYTES # BLD AUTO: 38.7 % — SIGNIFICANT CHANGE UP (ref 13–44)
LYMPHOCYTES # BLD AUTO: 47.3 % — HIGH (ref 13–44)
MACROCYTES BLD QL: SLIGHT — SIGNIFICANT CHANGE UP
MAGNESIUM SERPL-MCNC: 1.6 MG/DL — SIGNIFICANT CHANGE UP (ref 1.6–2.6)
MANUAL SMEAR VERIFICATION: SIGNIFICANT CHANGE UP
MANUAL SMEAR VERIFICATION: SIGNIFICANT CHANGE UP
MCHC RBC-ENTMCNC: 27.3 PG — SIGNIFICANT CHANGE UP (ref 27–34)
MCHC RBC-ENTMCNC: 32.9 GM/DL — SIGNIFICANT CHANGE UP (ref 32–36)
MCV RBC AUTO: 83 FL — SIGNIFICANT CHANGE UP (ref 80–100)
METHADONE UR-MCNC: NEGATIVE — SIGNIFICANT CHANGE UP
MICROCYTES BLD QL: SLIGHT — SIGNIFICANT CHANGE UP
MONOCYTES # BLD AUTO: 0.38 K/UL — SIGNIFICANT CHANGE UP (ref 0–0.9)
MONOCYTES # BLD AUTO: 0.43 K/UL — SIGNIFICANT CHANGE UP (ref 0–0.9)
MONOCYTES NFR BLD AUTO: 10.7 % — SIGNIFICANT CHANGE UP (ref 2–14)
MONOCYTES NFR BLD AUTO: 8.1 % — SIGNIFICANT CHANGE UP (ref 2–14)
NEUTROPHILS # BLD AUTO: 1.66 K/UL — LOW (ref 1.8–7.4)
NEUTROPHILS # BLD AUTO: 2.22 K/UL — SIGNIFICANT CHANGE UP (ref 1.8–7.4)
NEUTROPHILS NFR BLD AUTO: 36.6 % — LOW (ref 43–77)
NEUTROPHILS NFR BLD AUTO: 46 % — SIGNIFICANT CHANGE UP (ref 43–77)
NEUTS BAND # BLD: 0.9 % — SIGNIFICANT CHANGE UP (ref 0–8)
NEUTS BAND # BLD: 4.5 % — SIGNIFICANT CHANGE UP (ref 0–8)
OPIATES UR-MCNC: NEGATIVE — SIGNIFICANT CHANGE UP
OVALOCYTES BLD QL SMEAR: SLIGHT — SIGNIFICANT CHANGE UP
PCP SPEC-MCNC: SIGNIFICANT CHANGE UP
PCP UR-MCNC: NEGATIVE — SIGNIFICANT CHANGE UP
PHOSPHATE SERPL-MCNC: 3.2 MG/DL — SIGNIFICANT CHANGE UP (ref 2.5–4.5)
PLAT MORPH BLD: ABNORMAL
PLAT MORPH BLD: ABNORMAL
PLATELET # BLD AUTO: 108 K/UL — LOW (ref 150–400)
POIKILOCYTOSIS BLD QL AUTO: SIGNIFICANT CHANGE UP
POIKILOCYTOSIS BLD QL AUTO: SLIGHT — SIGNIFICANT CHANGE UP
POTASSIUM SERPL-MCNC: 4.1 MMOL/L — SIGNIFICANT CHANGE UP (ref 3.5–5.3)
POTASSIUM SERPL-SCNC: 4.1 MMOL/L — SIGNIFICANT CHANGE UP (ref 3.5–5.3)
PROCALCITONIN SERPL-MCNC: 0.15 NG/ML — HIGH (ref 0.02–0.1)
PROT SERPL-MCNC: 5.7 G/DL — LOW (ref 6–8.3)
RBC # BLD: 3.77 M/UL — LOW (ref 3.8–5.2)
RBC # FLD: 12.7 % — SIGNIFICANT CHANGE UP (ref 10.3–14.5)
RBC BLD AUTO: ABNORMAL
RBC BLD AUTO: ABNORMAL
SMUDGE CELLS # BLD: PRESENT — SIGNIFICANT CHANGE UP
SMUDGE CELLS # BLD: PRESENT — SIGNIFICANT CHANGE UP
SODIUM SERPL-SCNC: 137 MMOL/L — SIGNIFICANT CHANGE UP (ref 135–145)
SPHEROCYTES BLD QL SMEAR: SLIGHT — SIGNIFICANT CHANGE UP
T4 AB SER-ACNC: 9.94 UG/DL — SIGNIFICANT CHANGE UP (ref 4.5–11.7)
THC UR QL: NEGATIVE — SIGNIFICANT CHANGE UP
VARIANT LYMPHS # BLD: 3.6 % — SIGNIFICANT CHANGE UP (ref 0–6)
WBC # BLD: 4.05 K/UL — SIGNIFICANT CHANGE UP (ref 3.8–10.5)
WBC # FLD AUTO: 4.05 K/UL — SIGNIFICANT CHANGE UP (ref 3.8–10.5)

## 2023-02-16 PROCEDURE — 99233 SBSQ HOSP IP/OBS HIGH 50: CPT | Mod: GC

## 2023-02-16 RX ORDER — THIAMINE MONONITRATE (VIT B1) 100 MG
500 TABLET ORAL ONCE
Refills: 0 | Status: COMPLETED | OUTPATIENT
Start: 2023-02-16 | End: 2023-02-16

## 2023-02-16 RX ORDER — SODIUM CHLORIDE 9 MG/ML
1000 INJECTION, SOLUTION INTRAVENOUS ONCE
Refills: 0 | Status: COMPLETED | OUTPATIENT
Start: 2023-02-16 | End: 2023-02-16

## 2023-02-16 RX ORDER — SODIUM CHLORIDE 9 MG/ML
1000 INJECTION, SOLUTION INTRAVENOUS
Refills: 0 | Status: DISCONTINUED | OUTPATIENT
Start: 2023-02-16 | End: 2023-02-17

## 2023-02-16 RX ADMIN — Medication 105 MILLIGRAM(S): at 10:20

## 2023-02-16 RX ADMIN — Medication 1 APPLICATION(S): at 17:30

## 2023-02-16 RX ADMIN — Medication 6: at 07:10

## 2023-02-16 RX ADMIN — Medication 2: at 17:30

## 2023-02-16 RX ADMIN — Medication 110 MILLIGRAM(S): at 02:24

## 2023-02-16 RX ADMIN — Medication 2: at 21:38

## 2023-02-16 RX ADMIN — SODIUM CHLORIDE 1000 MILLILITER(S): 9 INJECTION, SOLUTION INTRAVENOUS at 01:09

## 2023-02-16 RX ADMIN — Medication 2: at 11:33

## 2023-02-16 RX ADMIN — Medication 85 MILLIMOLE(S): at 02:33

## 2023-02-16 RX ADMIN — Medication 1 APPLICATION(S): at 06:49

## 2023-02-16 RX ADMIN — INSULIN GLARGINE 5 UNIT(S): 100 INJECTION, SOLUTION SUBCUTANEOUS at 21:39

## 2023-02-16 RX ADMIN — ATORVASTATIN CALCIUM 20 MILLIGRAM(S): 80 TABLET, FILM COATED ORAL at 21:29

## 2023-02-16 RX ADMIN — ENOXAPARIN SODIUM 40 MILLIGRAM(S): 100 INJECTION SUBCUTANEOUS at 21:28

## 2023-02-16 RX ADMIN — SODIUM CHLORIDE 60 MILLILITER(S): 9 INJECTION, SOLUTION INTRAVENOUS at 02:46

## 2023-02-16 RX ADMIN — Medication 110 MILLIGRAM(S): at 17:30

## 2023-02-16 RX ADMIN — MEROPENEM 100 MILLIGRAM(S): 1 INJECTION INTRAVENOUS at 06:48

## 2023-02-16 RX ADMIN — Medication 250 MILLIGRAM(S): at 04:35

## 2023-02-16 NOTE — PROGRESS NOTE ADULT - PROBLEM SELECTOR PLAN 7
F: none  E: replete K>4 Mg>2  N: consistent carb  D: lovenox  dispo: [facility] Resolved   Na of 132 on admission, serum glucose 189; corrected Na 133. As of 2/16, hyponatremia resolved.   Plan:   - improved to 134, corrected 136 on 2/15  - Continue to monitor.

## 2023-02-16 NOTE — PROGRESS NOTE ADULT - PROBLEM SELECTOR PLAN 5
Na of 132 on admission. may be in setting of decreased PO intake as patient reported GI upset over the weekend  -f/u AM BMP, if persistently hyponatremic will pursue further work up Hgb of 10.6 on admission. Denies melena, hematochezia, hematuria. MCV 83  Plan:  - iron studies consistent with AOCD, elevated ferritin, low TIBC  - Hgb stable

## 2023-02-16 NOTE — PROGRESS NOTE ADULT - PROBLEM SELECTOR PLAN 6
c/w rosuvastatin 5 Pt reportedly taking lisinopril 20mg qd at home  Plan:   - restart lisinopril 20mg  - c/w monitor

## 2023-02-16 NOTE — PROGRESS NOTE ADULT - ATTENDING COMMENTS
HTN, DM2, LE cellulitis with eschar now with metabolic encephalopathy and rash  physical as above  much clearer this PM  diffuse maculopapular rash, ? from beta lactams  EEG suggestive of metabolic encephalopathy, no seizures  cefepime toxicity a possibility  check GFR by cystatin c  continue doxycycline  decision making of high complexity

## 2023-02-16 NOTE — PROGRESS NOTE ADULT - PROBLEM SELECTOR PLAN 1
Pt presenting for a 4 week history of persistent LLE ulcer and cellulitis. States she does not know how she got the wound, may have had trauma to the area but is unsure. Followed up with urgent care; took a 10 day course of Keflex which did not improve the symptoms, followed by one week of Clindamycin. Given persistent symptoms, patient followed with ID, Dr. Monroy outpatient, who prescribed Bactrim. After one week of Bactrim, pt had a follow up visit today and given persistent erythema, Dr. Monroy recommended admission for IV antibiotics. Pt denies fevers/chills at home, not septic on admission. On physical exam, 3x3 cm area of eschar with surrounding erythema. No tenderness, fluctuance or purulence  CT leg without evidence of ostemyelitis or fluid collection   Surgery consulted, no plan for debriedement at this time   s/p Vanc and CTX in the ED and Vanc + Meropenem. These have been stopped due to AMS with suspicion for Meropenem being the culprit. Patient also has new onset upper body rash, likely 2/2 meropenem hypersensitivity. No antihistamines will be given at this time given the patient's AMS   Blood culture from 2/15 without growth. Will f/u final results   - Wound care consulted. Will follow up recs   - C/w doxycicline 100mg q12. Will follow up with Dr. Monroy for antibiotics duration On 2/15, pt was noted to be lethargic, AAOx2 so cefepime was discontinued. At approximately 1700, Rapid response was called for AMS. Upon arrival to bedside, she was not tachycardic, febrile, or hypoxic, but noted to have hypertensive. Upon exam. patient was encephalopathic, and unable to follow commands, responsive to pain. POCT glucose performed, at 180s. Lactate was negative. CTH negative. After CTH she began to have increased muscle rigidity with generalized shaking c/f rigors vs seizures vs myoclonus. Repeat lactate and blood cultures drawn. CXR ordered. UA ordered. She was stepped up to [facility] for closer monitoring. Upon discussion with Dr. Monroy, her antibiotics were broadened from vanc/CTX to vanc/meropenem. vEEG ordered.  No concern for meningitis at this time given absence of systemic inflammatory signs and nuchal rigidity on exam    Utox negative - no concern for drug intoxication   Electrolyte wnl   - Vanc and meropenem discontinued in the setting of AMS and new rush   - TSH low, T4 found to be WNL. Likely euthyroid sick syndrome   - F/u RUQ ultrasound i/s/o uptrending LFTs, emesis, and mild abdominal pain  - Will d/c zofran d/2 concerns for rigors i/s/o Zofran push during rapid response   - C/w LR @60 cc/hr   - EEG in place. F/u final read  - May consider MRI

## 2023-02-16 NOTE — PROGRESS NOTE ADULT - PROBLEM SELECTOR PLAN 4
Hgb of 10.6 on admission. Denies melena, hematochezia, hematuria. MCV 83  -f/u iron studies Pt reportedly taking Metformin 500mg qD, linagliptin 5mg, and insulin glargine pen 10-20U. A1c 8.9  Plan:   - mISS while inpatient  - start lantus 5U qd  - consistent carb diet.

## 2023-02-16 NOTE — PROGRESS NOTE ADULT - PROBLEM SELECTOR PLAN 9
F: none  E: replete K>4 Mg>2  N: consistent carb  D: lovenox  dispo: [facility]
F: none  E: replete K>4 Mg>2  N: consistent carb  D: lovenox  dispo: [facility]

## 2023-02-16 NOTE — PROGRESS NOTE ADULT - SUBJECTIVE AND OBJECTIVE BOX
Critical Care    INTERVAL HPI/OVERNIGHT EVENTS:    ANTIBIOTICS    MEDICATIONS  (STANDING):  atorvastatin 20 milliGRAM(s) Oral at bedtime  dextrose 5%. 1000 milliLiter(s) (50 mL/Hr) IV Continuous <Continuous>  dextrose 5%. 1000 milliLiter(s) (100 mL/Hr) IV Continuous <Continuous>  dextrose 50% Injectable 25 Gram(s) IV Push once  dextrose 50% Injectable 12.5 Gram(s) IV Push once  dextrose 50% Injectable 25 Gram(s) IV Push once  doxycycline IVPB      doxycycline IVPB 100 milliGRAM(s) IV Intermittent every 12 hours  enoxaparin Injectable 40 milliGRAM(s) SubCutaneous every 24 hours  glucagon  Injectable 1 milliGRAM(s) IntraMuscular once  hydrocortisone 2.5% Ointment 1 Application(s) Topical two times a day  insulin glargine Injectable (LANTUS) 5 Unit(s) SubCutaneous at bedtime  insulin lispro (ADMELOG) corrective regimen sliding scale   SubCutaneous Before meals and at bedtime  lactated ringers. 1000 milliLiter(s) (60 mL/Hr) IV Continuous <Continuous>  lisinopril 20 milliGRAM(s) Oral every 24 hours    MEDICATIONS  (PRN):  acetaminophen     Tablet .. 650 milliGRAM(s) Oral every 6 hours PRN Moderate Pain (4 - 6)  dextrose Oral Gel 15 Gram(s) Oral once PRN Blood Glucose LESS THAN 70 milliGRAM(s)/deciliter  melatonin 5 milliGRAM(s) Oral at bedtime PRN Insomnia      Allergies    No Known Allergies    Intolerances        REVIEW OF SYSTEMS:      Eyes: No eye pain, visual disturbances, or discharge  ENMT:  No difficulty hearing, tinnitus, vertigo; No sinus or throat pain  Neck: No pain or stiffness  Respiratory: No cough, wheezing, chills or hemoptysis  Cardiovascular: No chest pain, palpitations, shortness of breath, dizziness or leg swelling  Gastrointestinal: No abdominal or epigastric pain. No nausea, vomiting or hematemesis; No diarrhea or constipation. No melena or hematochezia.  Genitourinary: No dysuria, frequency, hematuria or incontinence  Rectal: No pain, hemorrhoids or incontinence  Neurological: No headaches, memory loss, loss of strength, numbness or tremors  Skin: No itching, burning, rashes or lesions     Vital Signs Last 24 Hrs  T(C): 36.8 (16 Feb 2023 10:15), Max: 36.8 (15 Feb 2023 21:46)  T(F): 98.2 (16 Feb 2023 10:15), Max: 98.2 (15 Feb 2023 21:46)  HR: 76 (16 Feb 2023 11:35) (76 - 88)  BP: 148/64 (16 Feb 2023 11:35) (105/72 - 185/77)  BP(mean): 92 (16 Feb 2023 11:35) (92 - 120)  RR: 15 (16 Feb 2023 11:35) (15 - 20)  SpO2: 96% (16 Feb 2023 11:35) (94% - 100%)    Parameters below as of 16 Feb 2023 11:35  Patient On (Oxygen Delivery Method): room air        PHYSICAL EXAM:    General:  in no acute distress  Eyes: PERRL, EOM intact; conjunctiva and sclera clear  Head: Normocephalic; atraumatic  ENMT: No nasal discharge; airway clear  Neck: Supple; non tender; no masses  Respiratory: No wheezes, rales or rhonchi  Cardiovascular: Regular rate and rhythm. S1 and S2 Normal; No murmurs, gallops or rubs  Gastrointestinal: Soft non-tender non-distended; Normal bowel sounds; No hepatosplenomegaly  Genitourinary: No costovertebral angle tenderness  Extremities: Normal range of motion, No clubbing, cyanosis or edema  Vascular: Peripheral pulses palpable 2+ bilaterally  Neurological: Alert and oriented x3  Skin: positive rash  Lymph Nodes: No acute cervical adenopathy  Musculoskeletal: Normal gait, tone, without deformities    LABS:                        10.3   4.05  )-----------( 108      ( 16 Feb 2023 05:30 )             31.3     02-16    137  |  100  |  8   ----------------------------<  297<H>  4.1   |  24  |  0.31<L>    Ca    8.3<L>      16 Feb 2023 05:30  Phos  3.2     02-16  Mg     1.6     02-16    TPro  5.7<L>  /  Alb  2.8<L>  /  TBili  0.6  /  DBili  x   /  AST  36  /  ALT  72<H>  /  AlkPhos  82  02-16    PT/INR - ( 15 Feb 2023 17:40 )   PT: 12.1 sec;   INR: 1.02          PTT - ( 15 Feb 2023 17:40 )  PTT:26.2 sec        MICROBIOLOGY:  Culture Results:   No growth at 12 hours (02-15 @ 19:14)  Culture Results:   No growth at 2 days. (02-13 @ 19:45)  Culture Results:   No growth at 2 days. (02-13 @ 19:40)      RADIOLOGY & ADDITIONAL STUDIES:

## 2023-02-16 NOTE — PROGRESS NOTE ADULT - ASSESSMENT
72F with pmhx of HTN, HLD, DM, who presents for management of 4 week history of LLE ulcer and cellulitis, admitted for IV antibiotics and further management. Her course was complicated by AMS and new upper body rash in the setting on antibiotics use, suspected meropenem as the culprit  72F with pmhx of HTN, HLD, DM, who presents for management of 4 week history of LLE ulcer and cellulitis, admitted for IV antibiotics and further management. Her course was complicated by AMS and new upper body rash in the setting on antibiotics use, suspected meropenem as the culprit

## 2023-02-16 NOTE — PROGRESS NOTE ADULT - PROBLEM SELECTOR PLAN 2
Reports she uses Metformin 500mg qD at home  -f/u A1c in AM  -mISS while inpatient  -consistent carb diet Pt presenting for a 4 week history of persistent LLE ulcer and cellulitis. States she does not know how she got the wound, may have had trauma to the area but is unsure. Followed up with urgent care; took a 10 day course of Keflex which did not improve the symptoms, followed by one week of Clindamycin. Given persistent symptoms, patient followed with ID, Dr. Monroy outpatient, who prescribed Bactrim. After one week of Bactrim, pt had a follow up visit today and given persistent erythema, Dr. Monroy recommended admission for IV antibiotics. Pt denies fevers/chills at home, not septic on admission. On physical exam, 3x3 cm area of eschar with surrounding erythema. No tenderness, fluctuance or purulence  CT leg without evidence of osteomyelitis or fluid collection   Surgery consulted, no plan for debridement at this time   s/p Vanc and CTX in the ED and Vanc + Meropenem. These have been stopped due to AMS with suspicion for Meropenem being the culprit. Patient also has new onset upper body rash, likely 2/2 meropenem hypersensitivity. No antihistamines will be given at this time given the patient's AMS   Blood culture from 2/15 without growth. Will f/u final results   Dermatology recs: warm compresses BID, desonide 0.05% ointment BID, and possible short prednisone taper starting at 60mg going down by 5mg every day.  - Wound care consulted. Will follow up recs   - C/w doxycicline 100mg q12. Will follow up with Dr. Monroy for antibiotics duration

## 2023-02-16 NOTE — PROGRESS NOTE ADULT - PROBLEM SELECTOR PLAN 3
Does not remember which medications she takes at home  -normotensive at home  -med rec in AM  -restart BP medications as needed On CTA of neck on 2/15, pt noted to have enlargement of the thyroid lobes (right greater than left) as well as the isthmus. The thyroid gland is heterogeneous with a large hypodense dominant nodule within the right lobe with punctate calcifications. The right lobe extends below the sternoclavicular notch. The trachea is deviated to the left with luminal narrowing. These findings may be secondary to underlying goiter. The right innominate and left common carotid artery are displaced. TSH obtained, c/w hypothyroidism. Speaking with daughter Lay, pt has always had goiter at baseline, but has had redness at area developing over past day.   No respiratory compromise. Patient breathing without difficulty and not requiring O2 supplementation   - T4 WNL on 2/15, not in thyroid storm at this time.

## 2023-02-17 VITALS
SYSTOLIC BLOOD PRESSURE: 165 MMHG | DIASTOLIC BLOOD PRESSURE: 88 MMHG | RESPIRATION RATE: 14 BRPM | OXYGEN SATURATION: 97 % | HEART RATE: 84 BPM

## 2023-02-17 LAB
ALBUMIN SERPL ELPH-MCNC: 2.9 G/DL — LOW (ref 3.3–5)
ALP SERPL-CCNC: 70 U/L — SIGNIFICANT CHANGE UP (ref 40–120)
ALT FLD-CCNC: 47 U/L — HIGH (ref 10–45)
ANION GAP SERPL CALC-SCNC: 11 MMOL/L — SIGNIFICANT CHANGE UP (ref 5–17)
AST SERPL-CCNC: 27 U/L — SIGNIFICANT CHANGE UP (ref 10–40)
BILIRUB SERPL-MCNC: 0.5 MG/DL — SIGNIFICANT CHANGE UP (ref 0.2–1.2)
BLD GP AB SCN SERPL QL: POSITIVE — SIGNIFICANT CHANGE UP
BUN SERPL-MCNC: 8 MG/DL — SIGNIFICANT CHANGE UP (ref 7–23)
CALCIUM SERPL-MCNC: 8.6 MG/DL — SIGNIFICANT CHANGE UP (ref 8.4–10.5)
CHLORIDE SERPL-SCNC: 102 MMOL/L — SIGNIFICANT CHANGE UP (ref 96–108)
CO2 SERPL-SCNC: 26 MMOL/L — SIGNIFICANT CHANGE UP (ref 22–31)
CREAT SERPL-MCNC: 0.33 MG/DL — LOW (ref 0.5–1.3)
EGFR: 109 ML/MIN/1.73M2 — SIGNIFICANT CHANGE UP
FOLATE SERPL-MCNC: 7.8 NG/ML — SIGNIFICANT CHANGE UP
GLUCOSE BLDC GLUCOMTR-MCNC: 129 MG/DL — HIGH (ref 70–99)
GLUCOSE BLDC GLUCOMTR-MCNC: 200 MG/DL — HIGH (ref 70–99)
GLUCOSE SERPL-MCNC: 155 MG/DL — HIGH (ref 70–99)
HCT VFR BLD CALC: 32 % — LOW (ref 34.5–45)
HGB BLD-MCNC: 10.5 G/DL — LOW (ref 11.5–15.5)
MAGNESIUM SERPL-MCNC: 1.3 MG/DL — LOW (ref 1.6–2.6)
MCHC RBC-ENTMCNC: 27 PG — SIGNIFICANT CHANGE UP (ref 27–34)
MCHC RBC-ENTMCNC: 32.8 GM/DL — SIGNIFICANT CHANGE UP (ref 32–36)
MCV RBC AUTO: 82.3 FL — SIGNIFICANT CHANGE UP (ref 80–100)
NRBC # BLD: 0 /100 WBCS — SIGNIFICANT CHANGE UP (ref 0–0)
PHOSPHATE SERPL-MCNC: 2.1 MG/DL — LOW (ref 2.5–4.5)
PLATELET # BLD AUTO: 117 K/UL — LOW (ref 150–400)
POTASSIUM SERPL-MCNC: 4.1 MMOL/L — SIGNIFICANT CHANGE UP (ref 3.5–5.3)
POTASSIUM SERPL-SCNC: 4.1 MMOL/L — SIGNIFICANT CHANGE UP (ref 3.5–5.3)
PROT SERPL-MCNC: 5.5 G/DL — LOW (ref 6–8.3)
RBC # BLD: 3.89 M/UL — SIGNIFICANT CHANGE UP (ref 3.8–5.2)
RBC # FLD: 12.9 % — SIGNIFICANT CHANGE UP (ref 10.3–14.5)
RH IG SCN BLD-IMP: POSITIVE — SIGNIFICANT CHANGE UP
SODIUM SERPL-SCNC: 139 MMOL/L — SIGNIFICANT CHANGE UP (ref 135–145)
VIT B12 SERPL-MCNC: 801 PG/ML — SIGNIFICANT CHANGE UP (ref 232–1245)
WBC # BLD: 4.96 K/UL — SIGNIFICANT CHANGE UP (ref 3.8–10.5)
WBC # FLD AUTO: 4.96 K/UL — SIGNIFICANT CHANGE UP (ref 3.8–10.5)

## 2023-02-17 PROCEDURE — 83550 IRON BINDING TEST: CPT

## 2023-02-17 PROCEDURE — 73590 X-RAY EXAM OF LOWER LEG: CPT

## 2023-02-17 PROCEDURE — 73701 CT LOWER EXTREMITY W/DYE: CPT

## 2023-02-17 PROCEDURE — 86803 HEPATITIS C AB TEST: CPT

## 2023-02-17 PROCEDURE — 82803 BLOOD GASES ANY COMBINATION: CPT

## 2023-02-17 PROCEDURE — 82746 ASSAY OF FOLIC ACID SERUM: CPT

## 2023-02-17 PROCEDURE — 83540 ASSAY OF IRON: CPT

## 2023-02-17 PROCEDURE — 82610 CYSTATIN C: CPT

## 2023-02-17 PROCEDURE — 83605 ASSAY OF LACTIC ACID: CPT

## 2023-02-17 PROCEDURE — 71045 X-RAY EXAM CHEST 1 VIEW: CPT

## 2023-02-17 PROCEDURE — 95720 EEG PHY/QHP EA INCR W/VEEG: CPT

## 2023-02-17 PROCEDURE — 86900 BLOOD TYPING SEROLOGIC ABO: CPT

## 2023-02-17 PROCEDURE — 82728 ASSAY OF FERRITIN: CPT

## 2023-02-17 PROCEDURE — 80307 DRUG TEST PRSMV CHEM ANLYZR: CPT

## 2023-02-17 PROCEDURE — 80053 COMPREHEN METABOLIC PANEL: CPT

## 2023-02-17 PROCEDURE — 84145 PROCALCITONIN (PCT): CPT

## 2023-02-17 PROCEDURE — 86850 RBC ANTIBODY SCREEN: CPT

## 2023-02-17 PROCEDURE — 87040 BLOOD CULTURE FOR BACTERIA: CPT

## 2023-02-17 PROCEDURE — 85610 PROTHROMBIN TIME: CPT

## 2023-02-17 PROCEDURE — 83036 HEMOGLOBIN GLYCOSYLATED A1C: CPT

## 2023-02-17 PROCEDURE — 82607 VITAMIN B-12: CPT

## 2023-02-17 PROCEDURE — 85652 RBC SED RATE AUTOMATED: CPT

## 2023-02-17 PROCEDURE — 84443 ASSAY THYROID STIM HORMONE: CPT

## 2023-02-17 PROCEDURE — 36415 COLL VENOUS BLD VENIPUNCTURE: CPT

## 2023-02-17 PROCEDURE — 84466 ASSAY OF TRANSFERRIN: CPT

## 2023-02-17 PROCEDURE — 95708 EEG WO VID EA 12-26HR UNMNTR: CPT

## 2023-02-17 PROCEDURE — 95700 EEG CONT REC W/VID EEG TECH: CPT

## 2023-02-17 PROCEDURE — 86880 COOMBS TEST DIRECT: CPT

## 2023-02-17 PROCEDURE — 84436 ASSAY OF TOTAL THYROXINE: CPT

## 2023-02-17 PROCEDURE — 87635 SARS-COV-2 COVID-19 AMP PRB: CPT

## 2023-02-17 PROCEDURE — 86901 BLOOD TYPING SEROLOGIC RH(D): CPT

## 2023-02-17 PROCEDURE — 99238 HOSP IP/OBS DSCHRG MGMT 30/<: CPT

## 2023-02-17 PROCEDURE — 93005 ELECTROCARDIOGRAM TRACING: CPT

## 2023-02-17 PROCEDURE — 85025 COMPLETE CBC W/AUTO DIFF WBC: CPT

## 2023-02-17 PROCEDURE — 84100 ASSAY OF PHOSPHORUS: CPT

## 2023-02-17 PROCEDURE — 70498 CT ANGIOGRAPHY NECK: CPT

## 2023-02-17 PROCEDURE — 70450 CT HEAD/BRAIN W/O DYE: CPT

## 2023-02-17 PROCEDURE — 85730 THROMBOPLASTIN TIME PARTIAL: CPT

## 2023-02-17 PROCEDURE — 82962 GLUCOSE BLOOD TEST: CPT

## 2023-02-17 PROCEDURE — 85379 FIBRIN DEGRADATION QUANT: CPT

## 2023-02-17 PROCEDURE — 86140 C-REACTIVE PROTEIN: CPT

## 2023-02-17 PROCEDURE — 99285 EMERGENCY DEPT VISIT HI MDM: CPT

## 2023-02-17 PROCEDURE — 83735 ASSAY OF MAGNESIUM: CPT

## 2023-02-17 PROCEDURE — 85027 COMPLETE CBC AUTOMATED: CPT

## 2023-02-17 PROCEDURE — 70496 CT ANGIOGRAPHY HEAD: CPT

## 2023-02-17 PROCEDURE — 96374 THER/PROPH/DIAG INJ IV PUSH: CPT

## 2023-02-17 PROCEDURE — 86870 RBC ANTIBODY IDENTIFICATION: CPT

## 2023-02-17 RX ORDER — HYDROCORTISONE 1 %
1 OINTMENT (GRAM) TOPICAL
Qty: 1 | Refills: 0
Start: 2023-02-17

## 2023-02-17 RX ORDER — MAGNESIUM SULFATE 500 MG/ML
2 VIAL (ML) INJECTION ONCE
Refills: 0 | Status: DISCONTINUED | OUTPATIENT
Start: 2023-02-17 | End: 2023-02-17

## 2023-02-17 RX ORDER — MAGNESIUM OXIDE 400 MG ORAL TABLET 241.3 MG
400 TABLET ORAL ONCE
Refills: 0 | Status: COMPLETED | OUTPATIENT
Start: 2023-02-17 | End: 2023-02-17

## 2023-02-17 RX ORDER — INSULIN GLARGINE 100 [IU]/ML
0 INJECTION, SOLUTION SUBCUTANEOUS
Qty: 0 | Refills: 0 | DISCHARGE

## 2023-02-17 RX ORDER — MAGNESIUM SULFATE 500 MG/ML
2 VIAL (ML) INJECTION ONCE
Refills: 0 | Status: COMPLETED | OUTPATIENT
Start: 2023-02-17 | End: 2023-02-17

## 2023-02-17 RX ORDER — SODIUM,POTASSIUM PHOSPHATES 278-250MG
1 POWDER IN PACKET (EA) ORAL ONCE
Refills: 0 | Status: COMPLETED | OUTPATIENT
Start: 2023-02-17 | End: 2023-02-17

## 2023-02-17 RX ADMIN — Medication 110 MILLIGRAM(S): at 05:37

## 2023-02-17 RX ADMIN — LISINOPRIL 20 MILLIGRAM(S): 2.5 TABLET ORAL at 11:12

## 2023-02-17 RX ADMIN — Medication 2: at 11:34

## 2023-02-17 RX ADMIN — Medication 1 PACKET(S): at 11:35

## 2023-02-17 RX ADMIN — Medication 25 GRAM(S): at 11:35

## 2023-02-17 RX ADMIN — Medication 1 APPLICATION(S): at 05:50

## 2023-02-17 RX ADMIN — MAGNESIUM OXIDE 400 MG ORAL TABLET 400 MILLIGRAM(S): 241.3 TABLET ORAL at 13:55

## 2023-02-17 RX ADMIN — Medication 1 APPLICATION(S): at 11:13

## 2023-02-17 NOTE — DISCHARGE NOTE PROVIDER - NSDCCPCAREPLAN_GEN_ALL_CORE_FT
PRINCIPAL DISCHARGE DIAGNOSIS  Diagnosis: Altered mental status  Assessment and Plan of Treatment: WHAT YOU NEED TO KNOW:  What is altered mental status? Altered mental status (AMS) is a disruption in how your brain works that causes a change in behavior. This change can happen suddenly or over days. AMS ranges from slight confusion to total disorientation and increased sleepiness to coma.   What causes AMS? AMS can be caused by physical, psychological, and environmental factors. In older adults, AMS may be caused by a combination of these factors. The following are some of the most common causes of AMS:  Diseases or conditions in the body that can affect your brain and nervous system:   Hypoxia (low oxygen levels)   Low or high blood sugar levels, or diabetic ketoacidosis  Heart attack   Dehydration, low or high blood sodium levels   Thyroid or adrenal gland disease   Urinary tract infection or renal failure   Diseases or conditions within your skull:   Seizures   Head traumas, concussions   Brain tumors or strokes   Brain disease, such as encephalopathy  High altitude cerebral edema (brain tissue swelling at high altitude)   Other factors:   Burns  Hypothermia (low body temperature), hyperthermia (high body temperature), or heat stroke   Toxic plants, such as mushrooms  Carbon monoxide   Drug or alcohol withdrawal   Psychiatric problems   What are the signs and symptoms of AMS? You, or those close to you, will notice changes in how you think and in your awareness, movements, and routines. The following are some of the more common signs:   Lack of concentration or forgetfulness   Slow responses   Hallucinations and changes in sleep patterns   Decreased or increased movement   Agitation or rambling speech   Cannot or will not follow reasonable requests   Cannot be aroused from sleep

## 2023-02-17 NOTE — DISCHARGE NOTE PROVIDER - HOSPITAL COURSE
#Discharge: do not delete    72F with pmhx of HTN, HLD, DM, who presents for management of 4 week history of LLE ulcer and cellulitis after failing PO abx. Started on vancomycin and CTX. Received 3 doses cefepime between 2/14 and 2/15. On 2/15 noted to have increased lethargy. Cefepime stopped. Rapid response called for AMS. Stroke code called. CTH negative, however, pt found to have goiter with tracheal deviation and narrowing without respiratory compromise. Pt started on Meropenem for progressively worsening mental status, suspecting AMS 2/2 Cefepime medication. The IV antibiotics were stopped in the setting of AMS and maculopapular rash. The patient's mental status has improved and EEG did not show any evidence of seizures, only toxic encephalopathy.     #Altered mental status.   On 2/15, pt was noted to be lethargic, AAOx2 so cefepime was discontinued. At approximately 1700, Rapid response was called for AMS. Upon arrival to bedside, she was not tachycardic, febrile, or hypoxic, but noted to have hypertensive. Upon exam. patient was encephalopathic, and unable to follow commands, responsive to pain. POCT glucose performed, at 180s. Lactate was negative. CTH negative. After CTH she began to have increased muscle rigidity with generalized shaking c/f rigors vs seizures vs myoclonus. Repeat lactate and blood cultures drawn. CXR ordered. UA ordered. She was stepped up to [facility] for closer monitoring. Upon discussion with Dr. Monroy, her antibiotics were broadened from vanc/CTX to vanc/meropenem.  No concern for meningitis at this time given absence of systemic inflammatory signs and nuchal rigidity on exam    Utox negative - no concern for drug intoxication   Electrolyte wnl   - Vanc and meropenem discontinued in the setting of AMS and new rush   - TSH low, T4 found to be WNL. Likely euthyroid sick syndrome   - F/u RUQ ultrasound i/s/o uptrending LFTs, emesis, and mild abdominal pain  - Will d/c zofran d/2 concerns for rigors i/s/o Zofran push during rapid response   - C/w LR @60 cc/hr   - EEG results:  Continuous Mild generalized   slowing suggestive of a Mild degree of diffuse or multifocal  dysfunction.  Initially there were frequent generalized periodic discharges with triphasic morphology, which is a nonspecific finding often seen in the setting of a diffuse or multifocal pathologic process, but may also  - May consider MRI.     Problem/Plan - 2:  ·  Problem: Cellulitis of left lower leg.   ·  Plan: Pt presenting for a 4 week history of persistent LLE ulcer and cellulitis. States she does not know how she got the wound, may have had trauma to the area but is unsure. Followed up with urgent care; took a 10 day course of Keflex which did not improve the symptoms, followed by one week of Clindamycin. Given persistent symptoms, patient followed with ID, Dr. Monroy outpatient, who prescribed Bactrim. After one week of Bactrim, pt had a follow up visit today and given persistent erythema, Dr. Monroy recommended admission for IV antibiotics. Pt denies fevers/chills at home, not septic on admission. On physical exam, 3x3 cm area of eschar with surrounding erythema. No tenderness, fluctuance or purulence  CT leg without evidence of osteomyelitis or fluid collection   Surgery consulted, no plan for debridement at this time   s/p Vanc and CTX in the ED and Vanc + Meropenem. These have been stopped due to AMS with suspicion for Meropenem being the culprit. Patient also has new onset upper body rash, likely 2/2 meropenem hypersensitivity. No antihistamines will be given at this time given the patient's AMS   Blood culture from 2/15 without growth. Will f/u final results   Dermatology recs: warm compresses BID, desonide 0.05% ointment BID, and possible short prednisone taper starting at 60mg going down by 5mg every day.  - Wound care consulted. Will follow up recs   - C/w doxycicline 100mg q12. Will follow up with Dr. Monroy for antibiotics duration.     Problem/Plan - 3:  ·  Problem: Goiter.   ·  Plan: On CTA of neck on 2/15, pt noted to have enlargement of the thyroid lobes (right greater than left) as well as the isthmus. The thyroid gland is heterogeneous with a large hypodense dominant nodule within the right lobe with punctate calcifications. The right lobe extends below the sternoclavicular notch. The trachea is deviated to the left with luminal narrowing. These findings may be secondary to underlying goiter. The right innominate and left common carotid artery are displaced. TSH obtained, c/w hypothyroidism. Speaking with daughter Lay, pt has always had goiter at baseline, but has had redness at area developing over past day.   No respiratory compromise. Patient breathing without difficulty and not requiring O2 supplementation   - T4 WNL on 2/15, not in thyroid storm at this time.     Problem/Plan - 4:  ·  Problem: DM (diabetes mellitus).   ·  Plan: Pt reportedly taking Metformin 500mg qD, linagliptin 5mg, and insulin glargine pen 10-20U. A1c 8.9  Plan:   - mISS while inpatient  - start lantus 5U qd  - consistent carb diet.     Problem/Plan - 5:  ·  Problem: Normocytic anemia.   ·  Plan: Hgb of 10.6 on admission. Denies melena, hematochezia, hematuria. MCV 83  Plan:  - iron studies consistent with AOCD, elevated ferritin, low TIBC  - Hgb stable.     Problem/Plan - 6:  ·  Problem: Hypertension.   ·  Plan: Pt reportedly taking lisinopril 20mg qd at home  Plan:   - restart lisinopril 20mg  - c/w monitor.     Problem/Plan - 7:  ·  Problem: Hyponatremia.   ·  Plan: Resolved   Na of 132 on admission, serum glucose 189; corrected Na 133. As of 2/16, hyponatremia resolved.   Plan:   - improved to 134, corrected 136 on 2/15  - Continue to monitor.     Problem/Plan - 8:  ·  Problem: Hyperlipidemia.   ·  Plan: Pt reportedly taking home meds: rosuvastatin 5mg QD  Plan:   - c/w home meds when pt stable.     Problem/Plan - 9:  ·  Problem: Prophylactic measure.       Patient was discharged to: Home     New medications:   Changes to old medications:  Medications that were stopped:    Items to follow up as outpatient:    Physical exam at the time of discharge:       #Discharge: do not delete    72F with pmhx of HTN, HLD, DM, who presents for management of 4 week history of LLE ulcer and cellulitis after failing PO abx. Started on vancomycin and CTX. Received 3 doses cefepime between 2/14 and 2/15. On 2/15 noted to have increased lethargy. Cefepime stopped. Rapid response called for AMS. Stroke code called. CTH negative, however, pt found to have goiter with tracheal deviation and narrowing without respiratory compromise. Pt started on Meropenem for progressively worsening mental status, suspecting AMS 2/2 Cefepime medication. The IV antibiotics were stopped in the setting of AMS and maculopapular rash. The patient's mental status has improved and EEG did not show any evidence of seizures, only toxic encephalopathy.     #Altered mental status.   On 2/15, pt was noted to be lethargic, AAOx2 so cefepime was discontinued. At approximately 1700, Rapid response was called for AMS. Upon arrival to bedside, she was not tachycardic, febrile, or hypoxic, but noted to have hypertensive. Upon exam. patient was encephalopathic, and unable to follow commands, responsive to pain. POCT glucose performed, at 180s. Lactate was negative. CTH negative. After CTH she began to have increased muscle rigidity with generalized shaking c/f rigors vs seizures vs myoclonus. Repeat lactate and blood cultures drawn. CXR ordered. UA ordered. She was stepped up to [facility] for closer monitoring. Upon discussion with Dr. Monroy, her antibiotics were broadened from vanc/CTX to vanc/meropenem.  No concern for meningitis at this time given absence of systemic inflammatory signs and nuchal rigidity on exam    Utox negative - no concern for drug intoxication   Electrolyte wnl   EEG: Continuous Mild generalized   slowing suggestive of a Mild degree of diffuse or multifocal  dysfunction.  Initially there were frequent generalized periodic discharges with triphasic morphology, which is a nonspecific finding often seen in the setting of a diffuse or multifocal pathologic process, but may also indicate underlying hyperexcitability. This significantly improved overnight.  - Vanc and meropenem discontinued in the setting of AMS and new rash   - TSH low, T4 found to be WNL. Likely euthyroid sick syndrome      #Cellulitis of left lower leg.   Pt presenting for a 4 week history of persistent LLE ulcer and cellulitis. States she does not know how she got the wound, may have had trauma to the area but is unsure. Followed up with urgent care; took a 10 day course of Keflex which did not improve the symptoms, followed by one week of Clindamycin. Given persistent symptoms, patient followed with ID, Dr. Monroy outpatient, who prescribed Bactrim. After one week of Bactrim, pt had a follow up visit today and given persistent erythema, Dr. Monroy recommended admission for IV antibiotics. Pt denies fevers/chills at home, not septic on admission. On physical exam, 3x3 cm area of eschar with surrounding erythema. No tenderness, fluctuance or purulence  CT leg without evidence of osteomyelitis or fluid collection   Surgery consulted, no plan for debridement at this time   s/p Vanc and CTX in the ED and Vanc + Meropenem. These have been stopped due to AMS with suspicion for Meropenem being the culprit. Patient also has new onset upper body rash, likely 2/2 meropenem hypersensitivity. No antihistamines will be given at this time given the patient's AMS   Blood culture from 2/15 without growth. Will f/u final results   Dermatology recs: warm compresses BID, desonide 0.05% ointment BID, and possible short prednisone taper starting at 60mg going down by 5mg every day.  - C/w doxycicline 100mg q12 for 4 more days     #Goiter.   On CTA of neck on 2/15, pt noted to have enlargement of the thyroid lobes (right greater than left) as well as the isthmus. The thyroid gland is heterogeneous with a large hypodense dominant nodule within the right lobe with punctate calcifications. The right lobe extends below the sternoclavicular notch. The trachea is deviated to the left with luminal narrowing. These findings may be secondary to underlying goiter. The right innominate and left common carotid artery are displaced. TSH obtained, c/w hypothyroidism. Speaking with daughter Lay, pt has always had goiter at baseline, but has had redness at area developing over past day.   No respiratory compromise. Patient breathing without difficulty and not requiring O2 supplementation   - TSH low, T4 WNL on 2/15     #Normocytic anemia.   Hgb of 10.6 on admission. Denies melena, hematochezia, hematuria. MCV 83  - iron studies consistent with AOCD, elevated ferritin, low TIBC  - Hgb stable.    #Hypertension.   Pt reportedly taking lisinopril 20mg qd at home  - C/w lisinopril 20mg    #Hyponatremia.   Resolved   Na of 132 on admission, serum glucose 189; corrected Na 133. As of 2/16, hyponatremia resolved.   - improved to 134, corrected 136 on 2/15  - Continue to monitor.    #Hyperlipidemia.   Pt reportedly taking home meds: rosuvastatin 5mg QD  Plan:   - c/w home meds when pt stable.    Patient was discharged to: Home     New medications: Doxycycline 100mg q12    Changes to old medications: None   Medications that were stopped: None     Items to follow up as outpatient: None     Physical exam at the time of discharge:  PHYSICAL EXAM  General: NAD  Head: NC/AT; MMM; PERRL; EOMI;  Neck: Supple; no JVD  Respiratory: CTAB; no wheezes/rales/rhonchi  Cardiovascular: Regular rhythm/rate; S1/S2+, no murmurs, rubs gallops   Gastrointestinal: Soft; NTND; bowel sounds normal and present  Extremities: WWP; no edema/cyanosis  Neurological: A&Ox3, CNII-XII grossly intact; no obvious focal deficits

## 2023-02-17 NOTE — DISCHARGE NOTE PROVIDER - NSDCMRMEDTOKEN_GEN_ALL_CORE_FT
Fesoterodine 8 mg oral tablet, extended release: 1 tab(s) orally once a day  linagliptin 5 mg oral tablet: 1 tab(s) orally once a day  lisinopril 20 mg oral tablet: 1 tab(s) orally once a day  MetFORMIN (Eqv-Glucophage XR) 500 mg oral tablet, extended release: 1 tab(s) orally once a day  rosuvastatin 5 mg oral tablet: 1 tab(s) orally once a day  [place] SoloStar 300 units/mL subcutaneous solution:    doxycycline hyclate 100 mg oral capsule: 1 cap(s) orally 2 times a day  Fesoterodine 8 mg oral tablet, extended release: 1 tab(s) orally once a day  hydrocortisone 2.5% topical ointment: 1 application topically 2 times a day  linagliptin 5 mg oral tablet: 1 tab(s) orally once a day  lisinopril 20 mg oral tablet: 1 tab(s) orally once a day  MetFORMIN (Eqv-Glucophage XR) 500 mg oral tablet, extended release: 1 tab(s) orally once a day  rosuvastatin 5 mg oral tablet: 1 tab(s) orally once a day  [place] SoloStar 300 units/mL subcutaneous solution: subcutaneous once a day (at bedtime)

## 2023-02-17 NOTE — PROGRESS NOTE ADULT - SUBJECTIVE AND OBJECTIVE BOX
Critical Care    INTERVAL HPI/OVERNIGHT EVENTS:    ANTIBIOTICS    MEDICATIONS  (STANDING):  atorvastatin 20 milliGRAM(s) Oral at bedtime  clobetasol 0.05% Ointment 1 Application(s) Topical every 12 hours  dextrose 5%. 1000 milliLiter(s) (50 mL/Hr) IV Continuous <Continuous>  dextrose 5%. 1000 milliLiter(s) (100 mL/Hr) IV Continuous <Continuous>  dextrose 50% Injectable 25 Gram(s) IV Push once  dextrose 50% Injectable 12.5 Gram(s) IV Push once  dextrose 50% Injectable 25 Gram(s) IV Push once  doxycycline IVPB      doxycycline IVPB 100 milliGRAM(s) IV Intermittent every 12 hours  enoxaparin Injectable 40 milliGRAM(s) SubCutaneous every 24 hours  glucagon  Injectable 1 milliGRAM(s) IntraMuscular once  hydrocortisone 2.5% Ointment 1 Application(s) Topical two times a day  insulin glargine Injectable (LANTUS) 5 Unit(s) SubCutaneous at bedtime  insulin lispro (ADMELOG) corrective regimen sliding scale   SubCutaneous Before meals and at bedtime  lactated ringers. 1000 milliLiter(s) (60 mL/Hr) IV Continuous <Continuous>  lisinopril 20 milliGRAM(s) Oral every 24 hours  magnesium sulfate  IVPB 2 Gram(s) IV Intermittent once  potassium phosphate / sodium phosphate Powder (PHOS-NaK) 1 Packet(s) Oral once    MEDICATIONS  (PRN):  acetaminophen     Tablet .. 650 milliGRAM(s) Oral every 6 hours PRN Moderate Pain (4 - 6)  dextrose Oral Gel 15 Gram(s) Oral once PRN Blood Glucose LESS THAN 70 milliGRAM(s)/deciliter  melatonin 5 milliGRAM(s) Oral at bedtime PRN Insomnia      Allergies    No Known Allergies    Intolerances        REVIEW OF SYSTEMS:    Constitutional: No fever, weight loss or fatigue  Eyes: No eye pain, visual disturbances, or discharge  ENMT:  No difficulty hearing, tinnitus, vertigo; No sinus or throat pain  Neck: No pain or stiffness  Respiratory: No cough, wheezing, chills or hemoptysis  Cardiovascular: No chest pain, palpitations, shortness of breath, dizziness or leg swelling  Gastrointestinal: No abdominal or epigastric pain. No nausea, vomiting or hematemesis; No diarrhea or constipation. No melena or hematochezia.  Genitourinary: No dysuria, frequency, hematuria or incontinence  Rectal: No pain, hemorrhoids or incontinence  Neurological: No headaches, memory loss, loss of strength, numbness or tremors  Skin: skin rash  Lymph Nodes: No enlarged glands  Endocrine: No heat or cold intolerance; No hair loss  Musculoskeletal: No joint pain or swelling; No muscle, back or extremity pain  Heme/Lymph: No easy bruising or bleeding gums  Allergy and Immunologic: No hives or eczema    Vital Signs Last 24 Hrs  T(C): 36.5 (17 Feb 2023 10:37), Max: 37.1 (17 Feb 2023 05:07)  T(F): 97.7 (17 Feb 2023 10:37), Max: 98.7 (17 Feb 2023 05:07)  HR: 70 (17 Feb 2023 08:24) (68 - 78)  BP: 162/80 (17 Feb 2023 08:24) (123/60 - 162/80)  BP(mean): 114 (17 Feb 2023 08:24) (86 - 114)  RR: 14 (17 Feb 2023 08:24) (14 - 16)  SpO2: 97% (17 Feb 2023 08:24) (95% - 97%)    Parameters below as of 17 Feb 2023 08:24  Patient On (Oxygen Delivery Method): room air        PHYSICAL EXAM:    General: Well developed; well nourished; in no acute distress  Eyes: PERRL, EOM intact; conjunctiva and sclera clear  Head: Normocephalic; atraumatic  ENMT: No nasal discharge; airway clear  Neck: Supple; non tender; no masses  Respiratory: No wheezes, rales or rhonchi  Cardiovascular: Regular rate and rhythm. S1 and S2 Normal; No murmurs, gallops or rubs  Gastrointestinal: Soft non-tender non-distended; Normal bowel sounds; No hepatosplenomegaly  Genitourinary: No costovertebral angle tenderness  Extremities: erythema mostly gone from area around escar  LABS:                        10.5   4.96  )-----------( 117      ( 17 Feb 2023 10:03 )             32.0     02-17    139  |  102  |  8   ----------------------------<  155<H>  4.1   |  26  |  0.33<L>    Ca    8.6      17 Feb 2023 10:03  Phos  2.1     02-17  Mg     1.3     02-17    TPro  5.5<L>  /  Alb  2.9<L>  /  TBili  0.5  /  DBili  x   /  AST  27  /  ALT  47<H>  /  AlkPhos  70  02-17    PT/INR - ( 15 Feb 2023 17:40 )   PT: 12.1 sec;   INR: 1.02          PTT - ( 15 Feb 2023 17:40 )  PTT:26.2 sec        MICROBIOLOGY:  Culture Results:   No growth at 1 day. (02-15 @ 19:14)  Culture Results:   No growth at 3 days. (02-13 @ 19:45)  Culture Results:   No growth at 3 days. (02-13 @ 19:40)      RADIOLOGY & ADDITIONAL STUDIES:

## 2023-02-17 NOTE — EEG REPORT - NS EEG TEXT BOX
[facility] Department of Neurology  Inpatient Continuous video-Electroencephalogram    Patient Name:	JUAQUIN FRANCISCO    :	1950  MRN:	[id number]    Study Start Date/Time:  2023, 10:07:05 AM  Study End Date/Time:      Brief Clinical History:  JUAUQIN FRANCISCO is a 72 year old Female; study performed to investigate for seizures or markers of epilepsy.    Diagnosis Code:   R56.9 convulsions/seizure  The live video was: unmonitored.    Pertinent Medications:  n/a    Acquisition Details:  Electroencephalography was acquired using a minimum of 21 channels on an [employer] system v 9.3.1 with electrode placement according to the standard International 10-20 system following ACNS (American Clinical Neurophysiology Society) guidelines for Long-Term Video EEG monitoring.  Anterior temporal T1 and T2 electrodes were utilized whenever possible.   The [employer] automated spike & seizure detections were all reviewed in detail, in addition to extensive portions of raw EEG.      Day 1: 2023 @ 10:07:05 AM to next morning @ 07:00 am  Background:  continuous, with predominantly alpha/theta frequencies.  Symmetry:  No persistent asymmetries of voltage or frequency.  Posterior Dominant Rhythm:  8 Hz symmetric, well-organized, and poorly-modulated.  Organization: Rudimentary.  Voltage:  Normal (20+ uV)  Variability: Yes. 		Reactivity: Yes.  N2 sleep: Symmetric, synchronous spindles and K complexes.  Spontaneous Activity:  No epileptiform discharges.  Periodic/rhythmic activity:  Initially there were frequent generalized periodic discharges with triphasic morphology. This improved in the latter half of the recording.   Events:  No electrographic seizures or significant clinical events.  Provocations:  Hyperventilation and Photic stimulation: was not performed.    Daily Summary:    1)	Continuous Mild generalized   slowing suggestive of a Mild degree of diffuse or multifocal  dysfunction.  2)	Initially there were frequent generalized periodic discharges with triphasic morphology, which is a nonspecific finding often seen in the setting of a diffuse or multifocal pathologic process, but may also indicate underlying hyperexcitability. This significantly improved overnight.      Geneva Adkins MD  Attending Neurologist, [facility]

## 2023-02-17 NOTE — PROGRESS NOTE ADULT - PROBLEM SELECTOR PLAN 1
left  leg cellulitis  almost all gone. The minimal erythema may just represent some dermatitis    Suggest doxycyline for 5 more days orally    Patient told she should followup with a dermatologist next week. She is getting some names    She was told to take the antibiotic with a lot of water. To stop it if she gets nauseous    She was told to call me if the leg erythema comes back or for any fever,chills or rashes    I told her that she is allergic to either vancomycin or Cephalosporins. This was written out for her    Discussed with patient and her daughter

## 2023-02-17 NOTE — DISCHARGE NOTE NURSING/CASE MANAGEMENT/SOCIAL WORK - NSDCPEFALRISK_GEN_ALL_CORE
For information on Fall & Injury Prevention, visit: [email or web address] OR  [email or web address] OR  https://www.cdc.gov/steadi/patient.html

## 2023-02-17 NOTE — DISCHARGE NOTE PROVIDER - CARE PROVIDER_API CALL
Jenniffer Beckett  OBSTETRICS AND GYNECOLOGY  [address] [place] [id number]  Phone: [phone]  Fax: [phone]  Scheduled Appointment: 02/22/2023 12:00 PM

## 2023-02-17 NOTE — DISCHARGE NOTE NURSING/CASE MANAGEMENT/SOCIAL WORK - PATIENT PORTAL LINK FT
You can access the FollowMyHealth Patient Portal offered by [facility] by registering at the following website: [email or web address]. By joining [employer]’s FollowMyHealth portal, you will also be able to view your health information using other applications (apps) compatible with our system.

## 2023-02-18 LAB
CULTURE RESULTS: SIGNIFICANT CHANGE UP
CULTURE RESULTS: SIGNIFICANT CHANGE UP
SPECIMEN SOURCE: SIGNIFICANT CHANGE UP
SPECIMEN SOURCE: SIGNIFICANT CHANGE UP

## 2023-02-20 LAB
CULTURE RESULTS: SIGNIFICANT CHANGE UP
SPECIMEN SOURCE: SIGNIFICANT CHANGE UP

## 2023-02-22 DIAGNOSIS — Z20.822 CONTACT WITH AND (SUSPECTED) EXPOSURE TO COVID-19: ICD-10-CM

## 2023-02-22 DIAGNOSIS — E07.81 SICK-EUTHYROID SYNDROME: ICD-10-CM

## 2023-02-22 DIAGNOSIS — I10 ESSENTIAL (PRIMARY) HYPERTENSION: ICD-10-CM

## 2023-02-22 DIAGNOSIS — L27.1 LOCALIZED SKIN ERUPTION DUE TO DRUGS AND MEDICAMENTS TAKEN INTERNALLY: ICD-10-CM

## 2023-02-22 DIAGNOSIS — E11.65 TYPE 2 DIABETES MELLITUS WITH HYPERGLYCEMIA: ICD-10-CM
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
## 2023-02-22 DIAGNOSIS — L03.116 CELLULITIS OF LEFT LOWER LIMB: ICD-10-CM

## 2023-02-22 DIAGNOSIS — E87.1 HYPO-OSMOLALITY AND HYPONATREMIA: ICD-10-CM

## 2023-02-22 DIAGNOSIS — I87.8 OTHER SPECIFIED DISORDERS OF VEINS: ICD-10-CM

## 2023-02-22 DIAGNOSIS — D63.8 ANEMIA IN OTHER CHRONIC DISEASES CLASSIFIED ELSEWHERE: ICD-10-CM

## 2023-02-22 DIAGNOSIS — E78.5 HYPERLIPIDEMIA, UNSPECIFIED: ICD-10-CM

## 2023-02-22 DIAGNOSIS — E03.9 HYPOTHYROIDISM, UNSPECIFIED: ICD-10-CM

## 2023-02-22 DIAGNOSIS — E04.9 NONTOXIC GOITER, UNSPECIFIED: ICD-10-CM

## 2023-02-22 DIAGNOSIS — L98.499 NON-PRESSURE CHRONIC ULCER OF SKIN OF OTHER SITES WITH UNSPECIFIED SEVERITY: ICD-10-CM

## 2023-02-22 DIAGNOSIS — R11.2 NAUSEA WITH VOMITING, UNSPECIFIED: ICD-10-CM

## 2023-02-22 DIAGNOSIS — D69.59 OTHER SECONDARY THROMBOCYTOPENIA: ICD-10-CM

## 2023-02-22 DIAGNOSIS — T36.1X5A ADVERSE EFFECT OF CEPHALOSPORINS AND OTHER BETA-LACTAM ANTIBIOTICS, INITIAL ENCOUNTER: ICD-10-CM

## 2023-02-22 DIAGNOSIS — R41.0 DISORIENTATION, UNSPECIFIED: ICD-10-CM

## 2023-02-22 DIAGNOSIS — Z79.84 LONG TERM (CURRENT) USE OF ORAL HYPOGLYCEMIC DRUGS: ICD-10-CM

## 2023-02-22 DIAGNOSIS — G92.8 OTHER TOXIC ENCEPHALOPATHY: ICD-10-CM
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
## 2023-11-08 NOTE — ED ADULT TRIAGE NOTE - HEIGHT IN CM
167.64
PAST SURGICAL HISTORY:  H/O arterial bypass of lower limb     Status post closed fracture of right femur

## 2024-11-19 ENCOUNTER — OUTPATIENT (OUTPATIENT)
Dept: OUTPATIENT SERVICES | Facility: HOSPITAL | Age: 74
LOS: 1 days | End: 2024-11-19
Payer: MEDICARE

## 2024-11-19 DIAGNOSIS — I10 ESSENTIAL (PRIMARY) HYPERTENSION: ICD-10-CM

## 2024-11-19 DIAGNOSIS — E11.9 TYPE 2 DIABETES MELLITUS WITHOUT COMPLICATIONS: ICD-10-CM

## 2024-11-19 PROBLEM — E78.5 HYPERLIPIDEMIA, UNSPECIFIED: Chronic | Status: ACTIVE | Noted: 2023-02-13

## 2024-11-19 PROCEDURE — 93017 CV STRESS TEST TRACING ONLY: CPT

## 2024-11-19 PROCEDURE — 78452 HT MUSCLE IMAGE SPECT MULT: CPT

## 2024-11-19 PROCEDURE — A9500: CPT

## 2024-11-19 PROCEDURE — 82962 GLUCOSE BLOOD TEST: CPT
